# Patient Record
Sex: FEMALE | Race: WHITE | NOT HISPANIC OR LATINO | Employment: STUDENT | URBAN - METROPOLITAN AREA
[De-identification: names, ages, dates, MRNs, and addresses within clinical notes are randomized per-mention and may not be internally consistent; named-entity substitution may affect disease eponyms.]

---
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
## 2024-01-23 ENCOUNTER — HOSPITAL ENCOUNTER (EMERGENCY)
Facility: HOSPITAL | Age: 19
Discharge: HOME | End: 2024-01-23
Attending: EMERGENCY MEDICINE | Admitting: EMERGENCY MEDICINE
Payer: OTHER GOVERNMENT

## 2024-01-23 VITALS
BODY MASS INDEX: 22.13 KG/M2 | WEIGHT: 158.07 LBS | HEIGHT: 71 IN | RESPIRATION RATE: 20 BRPM | DIASTOLIC BLOOD PRESSURE: 86 MMHG | HEART RATE: 70 BPM | TEMPERATURE: 97.7 F | OXYGEN SATURATION: 100 % | SYSTOLIC BLOOD PRESSURE: 122 MMHG

## 2024-01-23 DIAGNOSIS — F10.920 ALCOHOLIC INTOXICATION WITHOUT COMPLICATION: Primary | ICD-10-CM

## 2024-01-23 LAB
ALBUMIN SERPL-MCNC: 4.2 G/DL (ref 3.5–5.2)
ALBUMIN/GLOB SERPL: 1.6 G/DL
ALP SERPL-CCNC: 62 U/L (ref 39–117)
ALT SERPL W P-5'-P-CCNC: 6 U/L (ref 1–33)
AMPHET+METHAMPHET UR QL: NEGATIVE
ANION GAP SERPL CALCULATED.3IONS-SCNC: 15.3 MMOL/L (ref 5–15)
APAP SERPL-MCNC: <5 MCG/ML (ref 0–30)
AST SERPL-CCNC: 17 U/L (ref 1–32)
BARBITURATES UR QL SCN: NEGATIVE
BASOPHILS # BLD AUTO: 0.07 10*3/MM3 (ref 0–0.2)
BASOPHILS NFR BLD AUTO: 0.8 % (ref 0–1.5)
BENZODIAZ UR QL SCN: NEGATIVE
BILIRUB SERPL-MCNC: 0.2 MG/DL (ref 0–1.2)
BUN SERPL-MCNC: 11 MG/DL (ref 6–20)
BUN/CREAT SERPL: 12.8 (ref 7–25)
CALCIUM SPEC-SCNC: 8.4 MG/DL (ref 8.6–10.5)
CANNABINOIDS SERPL QL: POSITIVE
CHLORIDE SERPL-SCNC: 109 MMOL/L (ref 98–107)
CO2 SERPL-SCNC: 19.7 MMOL/L (ref 22–29)
COCAINE UR QL: NEGATIVE
CREAT SERPL-MCNC: 0.86 MG/DL (ref 0.57–1)
DEPRECATED RDW RBC AUTO: 51.7 FL (ref 37–54)
EGFRCR SERPLBLD CKD-EPI 2021: 99.9 ML/MIN/1.73
EOSINOPHIL # BLD AUTO: 0.08 10*3/MM3 (ref 0–0.4)
EOSINOPHIL NFR BLD AUTO: 0.9 % (ref 0.3–6.2)
ERYTHROCYTE [DISTWIDTH] IN BLOOD BY AUTOMATED COUNT: 17.3 % (ref 12.3–15.4)
ETHANOL BLD-MCNC: 269 MG/DL (ref 0–10)
ETHANOL UR QL: 0.27 %
FENTANYL UR-MCNC: NEGATIVE NG/ML
GLOBULIN UR ELPH-MCNC: 2.6 GM/DL
GLUCOSE SERPL-MCNC: 102 MG/DL (ref 65–99)
HCG INTACT+B SERPL-ACNC: <0.5 MIU/ML
HCT VFR BLD AUTO: 33.7 % (ref 34–46.6)
HGB BLD-MCNC: 10.2 G/DL (ref 12–15.9)
HOLD SPECIMEN: NORMAL
HOLD SPECIMEN: NORMAL
IMM GRANULOCYTES # BLD AUTO: 0.02 10*3/MM3 (ref 0–0.05)
IMM GRANULOCYTES NFR BLD AUTO: 0.2 % (ref 0–0.5)
LYMPHOCYTES # BLD AUTO: 3.09 10*3/MM3 (ref 0.7–3.1)
LYMPHOCYTES NFR BLD AUTO: 33.7 % (ref 19.6–45.3)
MAGNESIUM SERPL-MCNC: 2 MG/DL (ref 1.7–2.2)
MCH RBC QN AUTO: 24.4 PG (ref 26.6–33)
MCHC RBC AUTO-ENTMCNC: 30.3 G/DL (ref 31.5–35.7)
MCV RBC AUTO: 80.6 FL (ref 79–97)
METHADONE UR QL SCN: NEGATIVE
MONOCYTES # BLD AUTO: 0.43 10*3/MM3 (ref 0.1–0.9)
MONOCYTES NFR BLD AUTO: 4.7 % (ref 5–12)
NEUTROPHILS NFR BLD AUTO: 5.49 10*3/MM3 (ref 1.7–7)
NEUTROPHILS NFR BLD AUTO: 59.7 % (ref 42.7–76)
NRBC BLD AUTO-RTO: 0 /100 WBC (ref 0–0.2)
OPIATES UR QL: NEGATIVE
OXYCODONE UR QL SCN: NEGATIVE
PLATELET # BLD AUTO: 213 10*3/MM3 (ref 140–450)
PMV BLD AUTO: 11.5 FL (ref 6–12)
POTASSIUM SERPL-SCNC: 3 MMOL/L (ref 3.5–5.2)
PROT SERPL-MCNC: 6.8 G/DL (ref 6–8.5)
RBC # BLD AUTO: 4.18 10*6/MM3 (ref 3.77–5.28)
SALICYLATES SERPL-MCNC: <0.3 MG/DL
SODIUM SERPL-SCNC: 144 MMOL/L (ref 136–145)
WBC NRBC COR # BLD AUTO: 9.18 10*3/MM3 (ref 3.4–10.8)
WHOLE BLOOD HOLD COAG: NORMAL
WHOLE BLOOD HOLD SPECIMEN: NORMAL

## 2024-01-23 PROCEDURE — 80307 DRUG TEST PRSMV CHEM ANLYZR: CPT | Performed by: EMERGENCY MEDICINE

## 2024-01-23 PROCEDURE — 80143 DRUG ASSAY ACETAMINOPHEN: CPT | Performed by: EMERGENCY MEDICINE

## 2024-01-23 PROCEDURE — 83735 ASSAY OF MAGNESIUM: CPT | Performed by: EMERGENCY MEDICINE

## 2024-01-23 PROCEDURE — 99283 EMERGENCY DEPT VISIT LOW MDM: CPT

## 2024-01-23 PROCEDURE — 25810000003 SODIUM CHLORIDE 0.9 % SOLUTION: Performed by: EMERGENCY MEDICINE

## 2024-01-23 PROCEDURE — 84702 CHORIONIC GONADOTROPIN TEST: CPT | Performed by: EMERGENCY MEDICINE

## 2024-01-23 PROCEDURE — 82077 ASSAY SPEC XCP UR&BREATH IA: CPT | Performed by: EMERGENCY MEDICINE

## 2024-01-23 PROCEDURE — 80179 DRUG ASSAY SALICYLATE: CPT | Performed by: EMERGENCY MEDICINE

## 2024-01-23 PROCEDURE — 85025 COMPLETE CBC W/AUTO DIFF WBC: CPT

## 2024-01-23 PROCEDURE — 80053 COMPREHEN METABOLIC PANEL: CPT | Performed by: EMERGENCY MEDICINE

## 2024-01-23 RX ORDER — SODIUM CHLORIDE 0.9 % (FLUSH) 0.9 %
10 SYRINGE (ML) INJECTION AS NEEDED
Status: DISCONTINUED | OUTPATIENT
Start: 2024-01-23 | End: 2024-01-23 | Stop reason: HOSPADM

## 2024-01-23 RX ADMIN — SODIUM CHLORIDE 1000 ML: 9 INJECTION, SOLUTION INTRAVENOUS at 02:12

## 2024-01-23 NOTE — ED PROVIDER NOTES
Time: 4:51 AM EST  Date of encounter:  1/23/2024  Independent Historian/Clinical History and Information was obtained by:   Patient    History is limited by:  alcohol intoxication    Chief Complaint: Alcohol intoxication      History of Present Illness:  Patient is a 18 y.o. year old female who presents to the emergency department for evaluation of Alcohol intoxication.  Per report patient drank about 1/5 of whiskey.  Patient was combative with EMS.  She did vomit prior to arrival.  Patient's mother is at bedside.  No known history of drug use.    HPI    Patient Care Team  Primary Care Provider: Nena Arreola APRN    Past Medical History:     Allergies   Allergen Reactions    Shellfish Allergy Anaphylaxis    Iodine Anxiety     Past Medical History:   Diagnosis Date    Anxiety     Nausea and vomiting 06/08/2023    Panic disorder     Self-injurious behavior     Shellfish allergy     Suicide attempt     Last attempt 2020     Past Surgical History:   Procedure Laterality Date    COLONOSCOPY N/A 6/14/2023    Procedure: COLONOSCOPY;  Surgeon: Pj Mendoza MD;  Location: [facility] ENDOSCOPY;  Service: Gastroenterology;  Laterality: N/A;  normal colon    ENDOSCOPY N/A 6/14/2023    Procedure: ESOPHAGOGASTRODUODENOSCOPY  with biopsies;  Surgeon: Pj Mendoza MD;  Location: [facility] ENDOSCOPY;  Service: Gastroenterology;  Laterality: N/A;  normal egd     Family History   Problem Relation Age of Onset    Self-Injurious Behavior  Mother     Anxiety disorder Mother     Suicide Attempts Father     Depression Father     Bipolar disorder Father     Anxiety disorder Sister     Depression Sister     Colon cancer Neg Hx        Home Medications:  Prior to Admission medications    Medication Sig Start Date End Date Taking? Authorizing Provider   ARIPiprazole (Abilify) 5 MG tablet Take 1 tablet by mouth Daily. 9/19/23   Josi Stoner APRN   norethindrone-ethinyl estradiol FE (Loestrin Fe 1/20) 1-20 MG-MCG per tablet  "Take 1 tablet by mouth Daily. 10/4/23 10/3/24  Nena Arreola, APRN        Social History:   Social History     Tobacco Use    Smoking status: Never     Passive exposure: Never    Smokeless tobacco: Never    Tobacco comments:     Vape with nicotine    Vaping Use    Vaping Use: Every day    Substances: Nicotine, Flavoring    Devices: Disposable    Passive vaping exposure: Yes   Substance Use Topics    Alcohol use: Yes    Drug use: Not Currently     Types: Marijuana, Psilocybin         Review of Systems:  Review of Systems   Unable to perform ROS: Mental status change        Physical Exam:  /86 (BP Location: Left arm, Patient Position: Lying)   Pulse 70   Temp 97.7 °F (36.5 °C) (Axillary)   Resp 20   Ht 180.3 cm (71\")   Wt 71.7 kg (158 lb 1.1 oz)   SpO2 100%   BMI 22.05 kg/m²     Physical Exam  Constitutional:       Appearance: Normal appearance.   HENT:      Head: Normocephalic and atraumatic.      Nose: Nose normal.      Mouth/Throat:      Mouth: Mucous membranes are moist.   Eyes:      Extraocular Movements: Extraocular movements intact.      Conjunctiva/sclera: Conjunctivae normal.      Pupils: Pupils are equal, round, and reactive to light.   Cardiovascular:      Rate and Rhythm: Normal rate and regular rhythm.      Pulses: Normal pulses.      Heart sounds: Normal heart sounds.   Pulmonary:      Effort: Pulmonary effort is normal.      Breath sounds: Normal breath sounds.   Abdominal:      General: There is no distension.      Palpations: Abdomen is soft.      Tenderness: There is no abdominal tenderness.   Musculoskeletal:         General: Normal range of motion.      Cervical back: Normal range of motion.   Skin:     General: Skin is warm and dry.      Capillary Refill: Capillary refill takes less than 2 seconds.   Neurological:      General: No focal deficit present.      Mental Status: She is alert and oriented to person, place, and time. Mental status is at baseline.   Psychiatric:         Mood " and Affect: Mood normal.         Behavior: Behavior normal.                  Procedures:  Procedures      Medical Decision Making:      Comorbidities that affect care:    Anxiety    External Notes reviewed:    Previous Clinic Note: Patient was seen by behavioral health on 9/90/23 for recurrent depression, anxiety, PTSD.      The following orders were placed and all results were independently analyzed by me:  Orders Placed This Encounter   Procedures    [place] Draw    Comprehensive Metabolic Panel    Acetaminophen Level    Ethanol    Salicylate Level    Urine Drug Screen - Urine, Clean Catch    CBC Auto Differential    hCG, Quantitative, Pregnancy    Magnesium    NPO Diet NPO Type: Strict NPO    Continuous Pulse Oximetry    Vital Signs    Undress & Gown    Oxygen Therapy- Nasal Cannula; Titrate 1-6 LPM Per SpO2; 90 - 95%    POC Glucose Once    Insert Peripheral IV    Suicide Precautions    CBC & Differential    Green Top (Gel)    Lavender Top    Gold Top - SST    Light Blue Top       Medications Given in the Emergency Department:  Medications   sodium chloride 0.9 % flush 10 mL (has no administration in time range)   sodium chloride 0.9 % bolus 1,000 mL (1,000 mL Intravenous Not Given 1/23/24 0408)   sodium chloride 0.9 % bolus 1,000 mL (0 mL Intravenous Stopped 1/23/24 0236)        ED Course:         Labs:    Lab Results (last 24 hours)       Procedure Component Value Units Date/Time    CBC & Differential [[id number]]  (Abnormal) Collected: 01/23/24 0155    Specimen: Blood Updated: 01/23/24 0214    Narrative:      The following orders were created for panel order CBC & Differential.  Procedure                               Abnormality         Status                     ---------                               -----------         ------                     CBC Auto Differential[[id number]]        Abnormal            Final result                 Please view results for these tests on the individual orders.     Comprehensive Metabolic Panel [[id number]]  (Abnormal) Collected: 01/23/24 0155    Specimen: Blood Updated: 01/23/24 0253     Glucose 102 mg/dL      BUN 11 mg/dL      Creatinine 0.86 mg/dL      Sodium 144 mmol/L      Potassium 3.0 mmol/L      Chloride 109 mmol/L      CO2 19.7 mmol/L      Calcium 8.4 mg/dL      Total Protein 6.8 g/dL      Albumin 4.2 g/dL      ALT (SGPT) 6 U/L      AST (SGOT) 17 U/L      Alkaline Phosphatase 62 U/L      Total Bilirubin 0.2 mg/dL      Globulin 2.6 gm/dL      A/G Ratio 1.6 g/dL      BUN/Creatinine Ratio 12.8     Anion Gap 15.3 mmol/L      eGFR 99.9 mL/min/1.73     Narrative:      GFR Normal >60  Chronic Kidney Disease <60  Kidney Failure <15      Acetaminophen Level [[id number]]  (Normal) Collected: 01/23/24 0155    Specimen: Blood Updated: 01/23/24 0242     Acetaminophen <5.0 mcg/mL     Ethanol [[id number]]  (Abnormal) Collected: 01/23/24 0155    Specimen: Blood Updated: 01/23/24 0242     Ethanol 269 mg/dL      Ethanol % 0.269 %     Narrative:      Ethanol (Plasma)  <10 Essentially Negative    Toxic Concentrations           mg/dL    Flushing, slowing of reflexes    Impaired visual activity         Depression of CNS              >100  Possible Coma                  >300       Salicylate Level [[id number]]  (Normal) Collected: 01/23/24 0155    Specimen: Blood Updated: 01/23/24 0242     Salicylate <0.3 mg/dL     CBC Auto Differential [[id number]]  (Abnormal) Collected: 01/23/24 0155    Specimen: Blood Updated: 01/23/24 0214     WBC 9.18 10*3/mm3      RBC 4.18 10*6/mm3      Hemoglobin 10.2 g/dL      Hematocrit 33.7 %      MCV 80.6 fL      MCH 24.4 pg      MCHC 30.3 g/dL      RDW 17.3 %      RDW-SD 51.7 fl      MPV 11.5 fL      Platelets 213 10*3/mm3      Neutrophil % 59.7 %      Lymphocyte % 33.7 %      Monocyte % 4.7 %      Eosinophil % 0.9 %      Basophil % 0.8 %      Immature Grans % 0.2 %      Neutrophils, Absolute 5.49 10*3/mm3      Lymphocytes, Absolute 3.09 10*3/mm3       Monocytes, Absolute 0.43 10*3/mm3      Eosinophils, Absolute 0.08 10*3/mm3      Basophils, Absolute 0.07 10*3/mm3      Immature Grans, Absolute 0.02 10*3/mm3      nRBC 0.0 /100 WBC     hCG, Quantitative, Pregnancy [[id number]] Collected: 01/23/24 0155    Specimen: Blood Updated: 01/23/24 0241     HCG Quantitative <0.50 mIU/mL     Narrative:      HCG Ranges by Gestational Age    Females - non-pregnant premenopausal   </= 1mIU/mL HCG  Females - postmenopausal               </= 7mIU/mL HCG    3 Weeks       5.4   -      72 mIU/mL  4 Weeks      10.2   -     708 mIU/mL  5 Weeks       217   -   8,245 mIU/mL  6 Weeks       152   -  32,177 mIU/mL  7 Weeks     4,059   - 153,767 mIU/mL  8 Weeks    31,366   - 149,094 mIU/mL  9 Weeks    59,109   - 135,901 mIU/mL  10 Weeks   44,186   - 170,409 mIU/mL  12 Weeks   27,107   - 201,615 mIU/mL  14 Weeks   24,302   -  93,646 mIU/mL  15 Weeks   12,540   -  69,747 mIU/mL  16 Weeks    8,904   -  55,332 mIU/mL  17 Weeks    8,240   -  51,793 mIU/mL  18 Weeks    9,649   -  55,271 mIU/mL      Magnesium [[id number]]  (Normal) Collected: 01/23/24 0155    Specimen: Blood Updated: 01/23/24 0340     Magnesium 2.0 mg/dL     Urine Drug Screen - Urine, Clean Catch [[id number]]  (Abnormal) Collected: 01/23/24 0406    Specimen: Urine, Clean Catch Updated: 01/23/24 0439     Amphet/Methamphet, Screen Negative     Barbiturates Screen, Urine Negative     Benzodiazepine Screen, Urine Negative     Cocaine Screen, Urine Negative     Opiate Screen Negative     THC, Screen, Urine Positive     Methadone Screen, Urine Negative     Oxycodone Screen, Urine Negative     Fentanyl, Urine Negative    Narrative:      Negative Thresholds Per Drugs Screened:    Amphetamines                 500 ng/ml  Barbiturates                 200 ng/ml  Benzodiazepines              100 ng/ml  Cocaine                      300 ng/ml  Methadone                    300 ng/ml  Opiates                      300 ng/ml  Oxycodone                     100 ng/ml  THC                           50 ng/ml  Fentanyl                       5 ng/ml      The Normal Value for all drugs tested is negative. This report includes final unconfirmed screening results to be used for medical treatment purposes only. Unconfirmed results must not be used for non-medical purposes such as employment or legal testing. Clinical consideration should be applied to any drug of abuse test, particularly when unconfirmed results are used.                     Imaging:    No Radiology Exams Resulted Within Past 24 Hours      Differential Diagnosis and Discussion:    Psychiatric: Differential diagnosis includes but is not limited to depression, psychosis, bipolar disorder, anxiety, manic episode, schizophrenia, and substance abuse.    All labs were reviewed and interpreted by me.    MDM  Number of Diagnoses or Management Options  Diagnosis management comments: Patient is afebrile nontoxic-appearing.  Vital signs are stable.  At time of evaluation she is lying in bed in no acute distress.  Patient presented to the emergency department with alcohol intoxication.  Labs were obtained that showed elevated ALK level of 269.  She also tested positive for THC.  Patient was given IV fluids.  She was monitored for several hours.  On reevaluation patient is alert oriented and able to ambulate tolerating p.o. intake.  Mother is at bedside who will be at home with her.  Recommend follow-up with her primary physician.  Discussed return precautions, discharge instructions and answered all their questions.       Amount and/or Complexity of Data Reviewed  Clinical lab tests: reviewed  Review and summarize past medical records: yes  Independent visualization of images, tracings, or specimens: yes    Risk of Complications, Morbidity, and/or Mortality  Presenting problems: moderate  Management options: moderate                 Patient Care Considerations:    CHEST X-RAY: I considered ordering a chest x-ray however  no respiratory symptoms      Consultants/Shared Management Plan:    None    Social Determinants of Health:    Patient has presented with family members who are responsible, reliable and will ensure follow up care.      Disposition and Care Coordination:    Discharged: The patient is suitable and stable for discharge with no need for consideration of admission.    I have explained the patient´s condition, diagnoses and treatment plan based on the information available to me at this time. I have answered questions and addressed any concerns. The patient has a good  understanding of the patient´s diagnosis, condition, and treatment plan as can be expected at this point. The vital signs have been stable. The patient´s condition is stable and appropriate for discharge from the emergency department.      The patient will pursue further outpatient evaluation with the primary care physician or other designated or consulting physician as outlined in the discharge instructions. They are agreeable to this plan of care and follow-up instructions have been explained in detail. The patient has received these instructions in written format and have expressed an understanding of the discharge instructions. The patient is aware that any significant change in condition or worsening of symptoms should prompt an immediate return to this or the closest emergency department or call to 911.  I have explained discharge medications and the need for follow up with the patient/caretakers. This was also printed in the discharge instructions. Patient was discharged with the following medications and follow up:      Medication List      No changes were made to your prescriptions during this visit.      Nena Arreola, APRN  [address] [address]  [phone]    In 2 days         Final diagnoses:   Alcoholic intoxication without complication        ED Disposition       ED Disposition   Discharge    Condition   Stable     Comment   --               This medical record created using voice recognition software.             Reyes Robertson MD  01/23/24 3443

## 2024-01-23 NOTE — ED NOTES
Patient found standing in room with parent. Patient able to walk to restroom with parent and obtain urine sample. Patient now in bed asleep lying on side.

## 2024-06-07 ENCOUNTER — TELEPHONE (OUTPATIENT)
Dept: INTERNAL MEDICINE | Facility: CLINIC | Age: 19
End: 2024-06-07
Payer: OTHER GOVERNMENT

## 2024-06-07 NOTE — TELEPHONE ENCOUNTER
Pt needs in office appt to discuss early pregnancy and symptoms. If she is out of town, she needs to est with PCP/OB in [place]. She can try increasing frequency of small meals, increased fluid intake, otc unisom to help but needs to be seen/evaluated for this in person.

## 2024-06-07 NOTE — TELEPHONE ENCOUNTER
Pt return call to the office, explained to pt she needs an office apt, pt agreeable to apt, apt scheduled for next Friday with PCP.

## 2024-06-07 NOTE — TELEPHONE ENCOUNTER
Patient called office stating she is almost 2 months pregnant and she has been having morning sickness to the point that she has been going to the ER, pt states she can't keep anything down, patient states she was prescribe phenergan but it didn't really help the only time it did was thru IV, I ask patient if she was seeing OB and she states she is not, she is needing a referral for OB, pt is currently in [place] for school, patient would like to know what she can do to help with her symptoms, please advise

## 2024-08-29 ENCOUNTER — OFFICE VISIT (OUTPATIENT)
Dept: INTERNAL MEDICINE | Facility: CLINIC | Age: 19
End: 2024-08-29
Payer: OTHER GOVERNMENT

## 2024-08-29 VITALS
WEIGHT: 152 LBS | HEIGHT: 71 IN | RESPIRATION RATE: 18 BRPM | OXYGEN SATURATION: 100 % | SYSTOLIC BLOOD PRESSURE: 122 MMHG | TEMPERATURE: 99.1 F | HEART RATE: 100 BPM | BODY MASS INDEX: 21.28 KG/M2 | DIASTOLIC BLOOD PRESSURE: 64 MMHG

## 2024-08-29 DIAGNOSIS — F43.21 GRIEF: ICD-10-CM

## 2024-08-29 DIAGNOSIS — F41.1 GENERALIZED ANXIETY DISORDER: ICD-10-CM

## 2024-08-29 DIAGNOSIS — Z30.013 ENCOUNTER FOR INITIAL PRESCRIPTION OF INJECTABLE CONTRACEPTIVE: ICD-10-CM

## 2024-08-29 DIAGNOSIS — F33.3 SEVERE EPISODE OF RECURRENT MAJOR DEPRESSIVE DISORDER, WITH PSYCHOTIC FEATURES: ICD-10-CM

## 2024-08-29 DIAGNOSIS — Z51.81 MEDICATION MONITORING ENCOUNTER: ICD-10-CM

## 2024-08-29 DIAGNOSIS — M25.50 ARTHRALGIA, UNSPECIFIED JOINT: Primary | ICD-10-CM

## 2024-08-29 LAB
B-HCG UR QL: NEGATIVE
CHROMATIN AB SERPL-ACNC: <10 IU/ML (ref 0–14)
CRP SERPL-MCNC: <0.3 MG/DL (ref 0–0.5)
ERYTHROCYTE [SEDIMENTATION RATE] IN BLOOD: 7 MM/HR (ref 0–20)
EXPIRATION DATE: NORMAL
INTERNAL NEGATIVE CONTROL: NEGATIVE
INTERNAL POSITIVE CONTROL: NORMAL
Lab: NORMAL

## 2024-08-29 PROCEDURE — 81025 URINE PREGNANCY TEST: CPT | Performed by: NURSE PRACTITIONER

## 2024-08-29 PROCEDURE — 86038 ANTINUCLEAR ANTIBODIES: CPT | Performed by: NURSE PRACTITIONER

## 2024-08-29 PROCEDURE — 99214 OFFICE O/P EST MOD 30 MIN: CPT | Performed by: NURSE PRACTITIONER

## 2024-08-29 PROCEDURE — 86140 C-REACTIVE PROTEIN: CPT | Performed by: NURSE PRACTITIONER

## 2024-08-29 PROCEDURE — 86431 RHEUMATOID FACTOR QUANT: CPT | Performed by: NURSE PRACTITIONER

## 2024-08-29 PROCEDURE — 96372 THER/PROPH/DIAG INJ SC/IM: CPT | Performed by: NURSE PRACTITIONER

## 2024-08-29 PROCEDURE — 36415 COLL VENOUS BLD VENIPUNCTURE: CPT | Performed by: NURSE PRACTITIONER

## 2024-08-29 PROCEDURE — 86200 CCP ANTIBODY: CPT | Performed by: NURSE PRACTITIONER

## 2024-08-29 PROCEDURE — 85652 RBC SED RATE AUTOMATED: CPT | Performed by: NURSE PRACTITIONER

## 2024-08-29 RX ORDER — ARIPIPRAZOLE 5 MG/1
5 TABLET ORAL DAILY
Qty: 30 TABLET | Refills: 1 | Status: SHIPPED | OUTPATIENT
Start: 2024-08-29

## 2024-08-29 RX ORDER — HYDROXYZINE HYDROCHLORIDE 25 MG/1
25 TABLET, FILM COATED ORAL 3 TIMES DAILY PRN
Qty: 60 TABLET | Refills: 0 | Status: SHIPPED | OUTPATIENT
Start: 2024-08-29

## 2024-08-29 RX ORDER — MEDROXYPROGESTERONE ACETATE 150 MG/ML
150 INJECTION, SUSPENSION INTRAMUSCULAR ONCE
Status: COMPLETED | OUTPATIENT
Start: 2024-08-29 | End: 2024-08-29

## 2024-08-29 RX ORDER — FAMOTIDINE 40 MG/1
40 TABLET, FILM COATED ORAL DAILY
Qty: 90 TABLET | Refills: 0 | Status: SHIPPED | OUTPATIENT
Start: 2024-08-29

## 2024-08-29 RX ADMIN — MEDROXYPROGESTERONE ACETATE 150 MG: 150 INJECTION, SUSPENSION INTRAMUSCULAR at 11:02

## 2024-08-29 NOTE — PROGRESS NOTES
"Chief Complaint  Contraception (Discuss new birth control. Discuss birth control shot), ADHD (Siblings has ADHD. Focusing and had to do tasks productive. Hard to understand when reading. Patient has had these issues since she was in school. ), Anxiety (Concerns with mental health for a year. Anxiety screening high.), and Depression (Patient did have a miscarriage recently. Thoughts of self harm. Depression screening high.)    Subjective          Brynn Jean presents to [facility]  History of Present Illness  Contraception-patient would like to discuss starting a new birth control.  Reports issues with taking daily pill.  Patiently recently had a miscarriage in June    Depression-she reports having thoughts of self-harm.  She has had some cutting recently  Having more anxiety over the last year which has worsened since having the miscarriage  She feels like \"my brain is separate from my body, like I don't know what is going on\"  She reports thoughts about self harm daily, often  She does not act on these thoughts often  She does have thoughts of killing herself but does not have a plan. She reports that she does not want to kill herself. She has been cutting, last time 2 wks ago.   She states \"I know I wouldn't want to hurt myself if I felt better\"  Has been doing some mindfulness practices with her mom which she reports helps. Mom reports panic attacks at times.  She reports taking the Abilify last year for a short period of time.  She states she only took this for a few days and stopped so she is unsure if it was effective.  She denies any side effects with it but states that she has bad at taking pills.  She is currently staying with her mom but residence is in [place].  She reports plan to move back to [facility].  Patient reports that she did enjoy seeing Josi and would like to see her again.    Having hip and back pain and hand pain bilateral  Family hx " "of RA    Also concerned about ADHD.  She reports that siblings have ADHD.  She has difficulty focusing and with staying on task.  Issues with concentrating while reading work.  She reports this is not a new issue but has been going on for many years.    Objective   Vital Signs:   /64 (BP Location: Left arm, Patient Position: Sitting, Cuff Size: Small Adult)   Pulse 100   Temp 99.1 °F (37.3 °C)   Resp 18   Ht 180.3 cm (71\")   Wt 68.9 kg (152 lb)   SpO2 100%   BMI 21.20 kg/m²     Physical Exam  Vitals and nursing note reviewed.   Constitutional:       General: She is not in acute distress.     Appearance: Normal appearance.   HENT:      Head: Normocephalic and atraumatic.      Right Ear: External ear normal.      Left Ear: External ear normal.      Nose: Nose normal.      Mouth/Throat:      Mouth: Mucous membranes are moist.   Eyes:      Conjunctiva/sclera: Conjunctivae normal.   Cardiovascular:      Rate and Rhythm: Normal rate and regular rhythm.      Pulses: Normal pulses.      Heart sounds: Normal heart sounds. No murmur heard.     No friction rub. No gallop.   Pulmonary:      Effort: Pulmonary effort is normal. No respiratory distress.      Breath sounds: No wheezing, rhonchi or rales.   Abdominal:      General: Bowel sounds are normal.      Palpations: Abdomen is soft.      Tenderness: There is no abdominal tenderness.   Musculoskeletal:      Cervical back: Neck supple.      Right lower leg: No edema.      Left lower leg: No edema.   Skin:     General: Skin is warm and dry.   Neurological:      General: No focal deficit present.      Mental Status: She is alert and oriented to person, place, and time.   Psychiatric:         Mood and Affect: Mood normal.         Behavior: Behavior normal.        Result Review :          Procedures      Assessment and Plan    Diagnoses and all orders for this visit:    1. Arthralgia, unspecified joint (Primary)  Comments:  Autoimmune labs  Orders:  -     GREG  -     " Cyclic Citrul Peptide Antibody, IgG / IgA  -     C-reactive Protein  -     Rheumatoid Factor  -     Sedimentation Rate    2. Severe episode of recurrent major depressive disorder, with psychotic features  -     ARIPiprazole (Abilify) 5 MG tablet; Take 1 tablet by mouth Daily.  Dispense: 30 tablet; Refill: 1    3. Generalized anxiety disorder  -     ARIPiprazole (Abilify) 5 MG tablet; Take 1 tablet by mouth Daily.  Dispense: 30 tablet; Refill: 1    4. Medication monitoring encounter  -     POCT pregnancy, urine    5. Encounter for initial prescription of injectable contraceptive  -     medroxyPROGESTERone (DEPO-PROVERA) injection 150 mg    6. Grief    Other orders  -     hydrOXYzine (ATARAX) 25 MG tablet; Take 1 tablet by mouth 3 (Three) Times a Day As Needed for Itching.  Dispense: 60 tablet; Refill: 0  -     famotidine (Pepcid) 40 MG tablet; Take 1 tablet by mouth Daily.  Dispense: 90 tablet; Refill: 0    Upreg in the office today negative.  Giving first Depo-Provera shot today in the office.  Patient tolerated well.  Discussed that this is a 3-month injection and she will need to return for her next injection and window as discussed.  recommended always using a back up form of birth control until she is ready to have a child and for STD protection.  Discussed that this will not be effective for the first month.  Denies concern for STDs at this time.  Patient verbalized understanding will call for concerns.    Restarting patient on Abilify for SI and self-harm behaviors.  Risks and benefits of medication discussed to include worsening risk for self-harm and SI.  Patient also rescheduled during visit today with Hafsa for September 4.  Discussed need for counseling and discussed grieving process at length.  Recommended that she continue to work on mindfulness practices and recommendations provided.  Will try hydroxyzine as needed for panic attacks.  Patient and mom verbalized risks and understanding of all that was  discussed.  Patient and mom agreeable to managing as an outpatient and preferred not to be evaluated for inpatient status at this time.  Patient agreeable to taking medication every day and allowing mom to assist with monitoring.  Patient verbalized understanding that if this changes, she can seek treatment at [facility] or the emergency department to be evaluated for inpatient treatment.  Patient will call with concerns or if she feels as if she may act on suicidal thoughts.          Follow Up   Return in about 2 weeks (around 9/12/2024).  Patient was given instructions and counseling regarding her condition or for health maintenance advice. Please see specific information pulled into the AVS if appropriate.

## 2024-08-30 LAB
ANA SER QL: NEGATIVE
CCP IGA+IGG SERPL IA-ACNC: 5 UNITS (ref 0–19)

## 2024-09-04 ENCOUNTER — OFFICE VISIT (OUTPATIENT)
Dept: BEHAVIORAL HEALTH | Facility: CLINIC | Age: 19
End: 2024-09-04
Payer: OTHER GOVERNMENT

## 2024-09-04 VITALS
BODY MASS INDEX: 21.7 KG/M2 | SYSTOLIC BLOOD PRESSURE: 117 MMHG | HEIGHT: 71 IN | WEIGHT: 155 LBS | DIASTOLIC BLOOD PRESSURE: 71 MMHG | HEART RATE: 70 BPM

## 2024-09-04 DIAGNOSIS — F31.60 BIPOLAR AFFECTIVE DISORDER, MIXED: ICD-10-CM

## 2024-09-04 DIAGNOSIS — F41.1 GENERALIZED ANXIETY DISORDER: Primary | ICD-10-CM

## 2024-09-04 DIAGNOSIS — F90.1 ADHD (ATTENTION DEFICIT HYPERACTIVITY DISORDER), PREDOMINANTLY HYPERACTIVE IMPULSIVE TYPE: ICD-10-CM

## 2024-09-04 DIAGNOSIS — F43.10 POST TRAUMATIC STRESS DISORDER (PTSD): ICD-10-CM
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
## 2024-09-04 NOTE — PATIENT INSTRUCTIONS
1.  Please return to clinic at your next scheduled visit.  Please contact the clinic ([phone]) at least 24 hours prior in the event you need to cancel.  2.  Do no harm to yourself or others.    3.  Avoid alcohol and drugs.    4.  Take all medications as prescribed.  Please contact the clinic with any concerns. If you are in need of medication refills, please call the clinic at [phone].    5. Should you want to get in touch with your provider, ARIANA Parra, please contact the office ([phone]), and staff will be able to page Josi directly.  6. In the event you have personal crisis, contact the following crisis numbers: Suicide Prevention Hotline 1-717.190.8624; [facility] Helpline 3-148-314-FUED; [facility] [phone]; text HELLO to 143184; or 966.     SPECIFIC RECOMMENDATIONS:     1.      Medications discussed at this encounter:     No orders of the defined types were placed in this encounter.                      2.      Psychotherapy recommendations: We will continue therapy at future visits.     3.     Return to clinic: Return in about 6 weeks (around 10/16/2024).
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
## 2024-09-12 ENCOUNTER — OFFICE VISIT (OUTPATIENT)
Dept: INTERNAL MEDICINE | Facility: CLINIC | Age: 19
End: 2024-09-12
Payer: OTHER GOVERNMENT

## 2024-09-12 VITALS
DIASTOLIC BLOOD PRESSURE: 72 MMHG | BODY MASS INDEX: 22.12 KG/M2 | TEMPERATURE: 97.6 F | HEIGHT: 71 IN | SYSTOLIC BLOOD PRESSURE: 124 MMHG | HEART RATE: 78 BPM | WEIGHT: 158 LBS | OXYGEN SATURATION: 98 %

## 2024-09-12 DIAGNOSIS — F41.1 GENERALIZED ANXIETY DISORDER: Primary | ICD-10-CM

## 2024-09-12 DIAGNOSIS — M25.50 ARTHRALGIA, UNSPECIFIED JOINT: ICD-10-CM

## 2024-09-12 DIAGNOSIS — F33.0 MAJOR DEPRESSIVE DISORDER, RECURRENT, MILD: ICD-10-CM

## 2024-09-12 DIAGNOSIS — Z30.9 ENCOUNTER FOR CONTRACEPTIVE MANAGEMENT, UNSPECIFIED TYPE: ICD-10-CM

## 2024-09-12 PROCEDURE — 90480 ADMN SARSCOV2 VAC 1/ONLY CMP: CPT | Performed by: NURSE PRACTITIONER

## 2024-09-12 PROCEDURE — 91320 SARSCV2 VAC 30MCG TRS-SUC IM: CPT | Performed by: NURSE PRACTITIONER

## 2024-09-12 PROCEDURE — 90471 IMMUNIZATION ADMIN: CPT | Performed by: NURSE PRACTITIONER

## 2024-09-12 PROCEDURE — 99214 OFFICE O/P EST MOD 30 MIN: CPT | Performed by: NURSE PRACTITIONER

## 2024-09-12 PROCEDURE — 90656 IIV3 VACC NO PRSV 0.5 ML IM: CPT | Performed by: NURSE PRACTITIONER

## 2024-09-12 NOTE — LETTER
[facility]  Vaccine Consent Form    Patient Name:  Brynn Jean  Patient :  2005     Vaccine(s) Ordered    COVID-19 F23 (Pfizer) 12yrs+ (COMIRNATY)  Fluzone >6mos (9512-4953)        Screening Checklist  The following questions should be completed prior to vaccination. If you answer “yes” to any question, it does not necessarily mean you should not be vaccinated. It just means we may need to clarify or ask more questions. If a question is unclear, please ask your healthcare provider to explain it.    Yes No   Any fever or moderate to severe illness today (mild illness and/or antibiotic treatment are not contraindications)?     Do you have a history of a serious reaction to any previous vaccinations, such as anaphylaxis, encephalopathy within 7 days, Guillain-[place] syndrome within 6 weeks, seizure?     Have you received any live vaccine(s) (e.g MMR, JOAQUIN) or any other vaccines in the last month (to ensure duplicate doses aren't given)?     Do you have an anaphylactic allergy to latex (DTaP, DTaP-IPV, Hep A, Hep B, MenB, RV, Td, Tdap), baker’s yeast (Hep B, HPV), polysorbates (RSV, nirsevimab, PCV 20, Rotavirrus, Tdap, Shingrix), or gelatin (JOAQUIN, MMR)?     Do you have an anaphylactic allergy to neomycin (Rabies, JOAQUIN, MMR, IPV, Hep A), polymyxin B (IPV), or streptomycin (IPV)?      Any cancer, leukemia, AIDS, or other immune system disorder? (JOAQUIN, MMR, RV)     Do you have a parent, brother, or sister with an immune system problem (if immune competence of vaccine recipient clinically verified, can proceed)? (MMR, JOAQUIN)     Any recent steroid treatments for >2 weeks, chemotherapy, or radiation treatment? (JOAQUIN, MMR)     Have you received antibody-containing blood transfusions or IVIG in the past 11 months (recommended interval is dependent on product)? (MMR, JOAQUIN)     Have you taken antiviral drugs (acyclovir, famciclovir, valacyclovir for JOAQUIN) in the last 24 or 48 hours, respectively?      Are you pregnant  "or planning to become pregnant within 1 month? (JOAQUIN, MMR, HPV, IPV, MenB, Abrexvy; For Hep B- refer to Engerix-B; For RSV - Abrysvo is indicated for 32-36 weeks of pregnancy from September to January)     For infants, have you ever been told your child has had intussusception or a medical emergency involving obstruction of the intestine (Rotavirus)? If not for an infant, can skip this question.         *Ordering Physicians/APC should be consulted if \"yes\" is checked by the patient or guardian above.  I have received, read, and understand the Vaccine Information Statement (VIS) for each vaccine ordered.  I have considered my or my child's health status as well as the health status of my close contacts.  I have taken the opportunity to discuss my vaccine questions with my or my child's health care provider.   I have requested that the ordered vaccine(s) be given to me or my child.  I understand the benefits and risks of the vaccines.  I understand that I should remain in the clinic for 15 minutes after receiving the vaccine(s).  _________________________________________________________  Signature of Patient or Parent/Legal Guardian ____________________  Date     "

## 2024-09-12 NOTE — PROGRESS NOTES
"Chief Complaint  Joint Pain and Contraception    Subjective          Brynn Jean presents to [facility]  History of Present Illness  She reports that  abilify is helping to regular mood better  Hydroxyzine helps with anxious thoughts. Taking bid prn  Some difficulty sleeping at night  Denies  self harm recently  Having less intrusive thoughts, denies SI  Currently doing counseling online which is helping  Has appt with Josi next month.   Staying at moms currently. Will be moving into an appt in 2 wks with boyfriend  She reports that boyfriend is very caring and concerned about her mental health.     She reports having more mood issues following Depo-Provera injection.  She would like to go back to oral contraceptive when she is due for her next Depo-Provera injection    Joint pain is ongoing.  Reviewed normal autoimmune labs  Reports not having healthy lifestyle habits currently due to mental health  Objective   Vital Signs:   /72   Pulse 78   Temp 97.6 °F (36.4 °C) (Temporal)   Ht 180.3 cm (71\")   Wt 71.7 kg (158 lb)   SpO2 98%   BMI 22.04 kg/m²     Physical Exam  Vitals and nursing note reviewed.   Constitutional:       General: She is not in acute distress.     Appearance: Normal appearance.   HENT:      Head: Normocephalic and atraumatic.      Right Ear: External ear normal.      Left Ear: External ear normal.      Nose: Nose normal.      Mouth/Throat:      Mouth: Mucous membranes are moist.   Eyes:      Conjunctiva/sclera: Conjunctivae normal.   Cardiovascular:      Rate and Rhythm: Normal rate and regular rhythm.      Pulses: Normal pulses.      Heart sounds: Normal heart sounds. No murmur heard.     No friction rub. No gallop.   Pulmonary:      Effort: Pulmonary effort is normal. No respiratory distress.      Breath sounds: No wheezing, rhonchi or rales.   Musculoskeletal:      Cervical back: Neck supple.      Right lower leg: No edema. "      Left lower leg: No edema.   Skin:     General: Skin is warm and dry.   Neurological:      General: No focal deficit present.      Mental Status: She is alert and oriented to person, place, and time.   Psychiatric:         Mood and Affect: Mood normal.         Behavior: Behavior normal.        Result Review :          Procedures      Assessment and Plan    Diagnoses and all orders for this visit:    1. Generalized anxiety disorder (Primary)    2. Major depressive disorder, recurrent, mild  Comments:  Doing well with Abilify.  She will plan to follow-up with Josi as scheduled    3. Encounter for contraceptive management, unspecified type  Comments:  Plan next visit for discussion of starting oral contraceptive    4. Arthralgia, unspecified joint  Comments:  She will continue to work on healthy lifestyle habits to include good sleep hygiene, healthy eating and increased physical activity    Other orders  -     COVID-19 F23 (Pfizer) 12yrs+ (COMIRNATY)  -     Fluzone >6mos ([id number])              Follow Up   Return in about 2 months (around 11/12/2024).  Patient was given instructions and counseling regarding her condition or for health maintenance advice. Please see specific information pulled into the AVS if appropriate.

## 2024-09-17 ENCOUNTER — TELEPHONE (OUTPATIENT)
Dept: INTERNAL MEDICINE | Facility: CLINIC | Age: 19
End: 2024-09-17
Payer: OTHER GOVERNMENT

## 2024-10-14 ENCOUNTER — TELEPHONE (OUTPATIENT)
Dept: PSYCHIATRY | Facility: CLINIC | Age: 19
End: 2024-10-14
Payer: OTHER GOVERNMENT

## 2024-10-14 DIAGNOSIS — F33.1 MODERATE EPISODE OF RECURRENT MAJOR DEPRESSIVE DISORDER: ICD-10-CM

## 2024-10-14 DIAGNOSIS — F41.1 GENERALIZED ANXIETY DISORDER: Primary | ICD-10-CM

## 2024-10-14 NOTE — TELEPHONE ENCOUNTER
PT CALLED STATES FEELS LIKE ABILIFY DOES NOT WORK STATES SHE DOES NOT FEELS LIKE MEDICATION IS WORKING NOR HELPING. PT STATES SHE IS REALLY DEPRESSED STATES HAS FELT LIKE THIS FOR OVER 6 MONTHS. PT STATES SHE FELT A LITTLE BETTER AS SHE RECENTLY STATES SHE FELT LIKE SHE WAS STRUGGLING WITH SELF HARM PT STATES SHE IS NOT ACTIVELY HAVING ANY SI PLEASE ADVISE. PT MOTHER WAS ON LINE ALSO INTERESTED IN PT STARTING LEXAPRO AS IT WORKED FOR HER AND PT SISTER

## 2024-10-15 NOTE — TELEPHONE ENCOUNTER
Is patient keeping up keeping appointment with me on October 23, 2024?  As patient is still taking Abilify 5 mg daily?  It is my understanding that she is going to be switching providers to Dr. Mazariegos.  Would be okay with initiating prescription for Lexapro if patient would like to start this medication before seeing him.  Just wanted to clarify as she has appointment scheduled with both myself and Dr. Mazariegos at this time.

## 2024-10-16 RX ORDER — ESCITALOPRAM OXALATE 5 MG/1
5 TABLET ORAL DAILY
Qty: 30 TABLET | Refills: 1 | Status: SHIPPED | OUTPATIENT
Start: 2024-10-16

## 2024-10-16 NOTE — TELEPHONE ENCOUNTER
Okay to start lexapro 5 mg daily  Previously patient I have been working through diagnoses considering ADHD versus bipolar affective disorder.  However, this has been difficult as patient did not return for 1 year for follow-up.  So, very difficult to address without routine follow-up.  Patient states that she has not been taking Abilify.  Reports taking hydroxyzine as needed for anxiety.  Patient would like to start Lexapro to target symptoms of depression.  We discussed the potential for induction of manic symptoms if patient does have bipolar affective disorder.  Patient expressed understanding and is agreeable to report any of the symptoms.  We do have a follow-up appointment on October 23, 2024.  We will evaluate for these symptoms.    LEXAPRO (ESCITALOPRAM)  Risks, benefits, alternatives discussed with patient including induction/activation of manic symptoms, GI upset, nausea vomiting diarrhea, theoretical decrease of seizure threshold predisposing the patient to a slightly higher seizure risk, headaches, sexual dysfunction, serotonin syndrome, bleeding risk.  After discussion of these risks and benefits, the patient voiced understanding and agreed to proceed.     Medication sent to patient's preferred pharmacy in record.

## 2024-10-16 NOTE — TELEPHONE ENCOUNTER
PT(PATIENT) VERIFIED     CONTACTED PT(PATIENT) PER PROVIDER'S INSTRUCTIONS     PT(PATIENT) STATES SHE STILL PLANS ON MEETING WITH PROVIDER ON Appointment with Josi Stoner APRN (10/23/2024)     NEW PROVIDER APPT  Appointment with Shane Culp MD (2025)     is still taking Abilify 5 mg daily?   PT(PATIENT) STATES SHE TRIED AGAIN, BUT DIDN'T FEEL LIKE IT WAS HELPING HER SO SHE STOPPED AGAIN     PT(PATIENT) STATES SHE DOESN'T FEEL LIKE SHE HAS MOOD ISSUES    PT(PATIENT) STATES SHE NEEDS SOMETHING TO HELP WITH HER DEPRESSION     initiating prescription for Lexapro if patient would like to start this medication   PT(PATIENT) STATES SHE WOULD LIKE TO TRY THE LEXAPRO    PLEASE ADVISE

## 2024-10-23 ENCOUNTER — OFFICE VISIT (OUTPATIENT)
Dept: BEHAVIORAL HEALTH | Facility: CLINIC | Age: 19
End: 2024-10-23
Payer: OTHER GOVERNMENT

## 2024-10-23 VITALS
SYSTOLIC BLOOD PRESSURE: 128 MMHG | BODY MASS INDEX: 23.94 KG/M2 | HEART RATE: 88 BPM | WEIGHT: 171 LBS | DIASTOLIC BLOOD PRESSURE: 82 MMHG | HEIGHT: 71 IN

## 2024-10-23 DIAGNOSIS — F41.1 GENERALIZED ANXIETY DISORDER: ICD-10-CM

## 2024-10-23 DIAGNOSIS — F43.10 POST TRAUMATIC STRESS DISORDER (PTSD): ICD-10-CM

## 2024-10-23 DIAGNOSIS — F31.60 BIPOLAR AFFECTIVE DISORDER, MIXED: Primary | ICD-10-CM

## 2024-10-23 RX ORDER — LURASIDONE HYDROCHLORIDE 20 MG/1
20 TABLET, FILM COATED ORAL DAILY
Qty: 30 TABLET | Refills: 1 | Status: SHIPPED | OUTPATIENT
Start: 2024-10-23
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
## 2024-10-23 NOTE — PATIENT INSTRUCTIONS
1.  Please return to clinic at your next scheduled visit.  Please contact the clinic ([phone]) at least 24 hours prior in the event you need to cancel.  2.  Do no harm to yourself or others.    3.  Avoid alcohol and drugs.    4.  Take all medications as prescribed.  Please contact the clinic with any concerns. If you are in need of medication refills, please call the clinic at [phone].    5. Should you want to get in touch with your provider, ARIANA Parra, please contact the office ([phone]), and staff will be able to page Josi directly.  6. In the event you have personal crisis, contact the following crisis numbers: Suicide Prevention Hotline 1-422.479.7169; [facility] Helpline 3-616-611-CLLW; [facility] Emergency Room [phone]; text HELLO to 163792; or 140.     SPECIFIC RECOMMENDATIONS:     1.      Medications discussed at this encounter:     New Medications Ordered This Visit   Medications    Lurasidone HCl (LATUDA) 20 MG tablet tablet     Sig: Take 1 tablet by mouth Daily.     Dispense:  30 tablet     Refill:  1                       2.      Psychotherapy recommendations: We will continue therapy at future visits.     3.     Return to clinic: Return in about 6 weeks (around 12/4/2024).

## 2024-11-14 ENCOUNTER — OFFICE VISIT (OUTPATIENT)
Dept: INTERNAL MEDICINE | Facility: CLINIC | Age: 19
End: 2024-11-14
Payer: OTHER GOVERNMENT

## 2024-11-14 VITALS
DIASTOLIC BLOOD PRESSURE: 74 MMHG | BODY MASS INDEX: 26.04 KG/M2 | WEIGHT: 186 LBS | OXYGEN SATURATION: 100 % | HEIGHT: 71 IN | RESPIRATION RATE: 18 BRPM | TEMPERATURE: 98.2 F | SYSTOLIC BLOOD PRESSURE: 120 MMHG | HEART RATE: 82 BPM

## 2024-11-14 DIAGNOSIS — R11.2 NAUSEA AND VOMITING, UNSPECIFIED VOMITING TYPE: ICD-10-CM

## 2024-11-14 DIAGNOSIS — Z97.5 IUD CONTRACEPTION: Primary | ICD-10-CM

## 2024-11-14 DIAGNOSIS — R51.9 NONINTRACTABLE HEADACHE, UNSPECIFIED CHRONICITY PATTERN, UNSPECIFIED HEADACHE TYPE: ICD-10-CM

## 2024-11-14 RX ORDER — OMEPRAZOLE 40 MG/1
40 CAPSULE, DELAYED RELEASE ORAL DAILY
Qty: 90 CAPSULE | Refills: 0 | Status: SHIPPED | OUTPATIENT
Start: 2024-11-14

## 2024-11-14 NOTE — PROGRESS NOTES
Chief Complaint  Contraception (Discuss IUD. Patient has used the Depo shot in the past and didn't like how it made her bleed a lot. Not currently doing any birth control now. Needing GYN referral. Does not have GYN currently. ), Headache (persistent headaches for 3 days- missed 1 Lexapro pill possibly from this?), Nausea (Hard time keeping food down. it hurts in the middle of her chest and makes her left arm hurt- 5 days now. With vomiting.), and Vomiting (5 days)      Subjective      History of Present Illness  The patient is a 19-year-old female who presents today to discuss a GYN referral for an IUD.    She has been experiencing headaches for the past few days, which she attributes to missing a dose of Lexapro. These headaches have been disrupting her sleep and affecting her mood. She has not taken any medication for the headaches. Additionally, she reports needing a new prescription for her eyeglasses.    She has been dealing with nausea and vomiting for the past 3 to 4 days, which has made it difficult for her to keep food down. She reports no changes in her condition over the past few days. She underwent an upper endoscopy in 06/2023 with Dr. Mendoza, which improved her symptoms. She continues to take Pepcid. She reports no diarrhea or constipation. She feels better after vomiting, which usually occurs after eating.    She has been taking Latuda at night to help with sleep, as Lexapro was causing insomnia. However, she has been experiencing intense nightmares since starting Latuda. She is concerned that Latuda may be causing these nightmares.    FAMILY HISTORY  Her mother also had issues with Lexapro when she missed a day of it. Her father has bipolar disorder.         Objective   Vital Signs:   Vitals:    11/14/24 0806   BP: 120/74   BP Location: Left arm   Patient Position: Sitting   Cuff Size: Adult   Pulse: 82   Resp: 18   Temp: 98.2 °F (36.8 °C)   TempSrc: Temporal   SpO2: 100%   Weight: 84.4 kg (186 lb)  "  Height: 180.3 cm (70.98\")     Body mass index is 25.96 kg/m².    Wt Readings from Last 3 Encounters:   11/14/24 84.4 kg (186 lb)   10/23/24 77.6 kg (171 lb)   09/12/24 71.7 kg (158 lb)     BP Readings from Last 3 Encounters:   11/14/24 120/74   10/23/24 128/82   09/12/24 124/72       Health Maintenance   Topic Date Due    HPV VACCINES (2 - 2-dose series) 05/07/2017    HEPATITIS C SCREENING  Never done    ANNUAL PHYSICAL  Never done    CHLAMYDIA SCREENING  Never done    TDAP/TD VACCINES (2 - Td or Tdap) 07/01/2025    BMI FOLLOWUP  11/14/2025    COVID-19 Vaccine  Completed    Pneumococcal Vaccine 0-64  Completed    INFLUENZA VACCINE  Completed    MENINGOCOCCAL VACCINE  Completed       Physical Exam  Vitals and nursing note reviewed.   Constitutional:       General: She is not in acute distress.     Appearance: Normal appearance.   HENT:      Head: Normocephalic and atraumatic.      Right Ear: External ear normal.      Left Ear: External ear normal.      Nose: Nose normal.      Mouth/Throat:      Mouth: Mucous membranes are moist.   Eyes:      Conjunctiva/sclera: Conjunctivae normal.   Cardiovascular:      Rate and Rhythm: Normal rate and regular rhythm.      Pulses: Normal pulses.      Heart sounds: Normal heart sounds. No murmur heard.     No friction rub. No gallop.   Pulmonary:      Effort: Pulmonary effort is normal. No respiratory distress.      Breath sounds: No wheezing, rhonchi or rales.   Musculoskeletal:      Cervical back: Neck supple.      Right lower leg: No edema.      Left lower leg: No edema.   Skin:     General: Skin is warm and dry.   Neurological:      General: No focal deficit present.      Mental Status: She is alert and oriented to person, place, and time.   Psychiatric:         Mood and Affect: Mood normal.         Behavior: Behavior normal.        Physical Exam        Result Review :  The following data was reviewed by: ARIANA King on 11/14/2024:         Results          "     Procedures            Assessment & Plan  IUD contraception    Orders:    Ambulatory Referral to Gynecology    Nonintractable headache, unspecified chronicity pattern, unspecified headache type         Nausea and vomiting, unspecified vomiting type              Assessment & Plan  1. Headache.  She has been experiencing headaches for the past few days, likely triggered by missing a dose of Lexapro. The headaches are not severe but are affecting her sleep and mood. She has been advised to take Tylenol or ibuprofen for pain relief. The use of an ice pack, heating pad, or hot water bottle has been suggested depending on her preference. She has been advised to avoid blue light exposure until the headache subsides.    2. Nausea and vomiting.  She has been experiencing nausea and vomiting for the past three to four days, which has made it difficult to keep food down. She had an upper scope last year with Dr. Mendoza, which improved her symptoms at that time. Omeprazole has been prescribed for a month, and she has been advised to continue taking Pepcid once daily, alternating between morning and night. A low acid diet has been recommended, avoiding acidic foods such as tomato-based products and citrus fruits. If symptoms do not improve, she may need to follow up with a GI specialist.    3. IUD placement.  A referral to gynecology has been made to discuss IUD options. She will be set up with Dr. Bryant at [facility] for an initial consultation, where different IUD options will be discussed. The actual placement will be scheduled during a subsequent visit.    4. Nightmares.  She has been experiencing intense nightmares after starting Latuda. She has been advised to contact her psychiatrist, Hafsa, for further evaluation and management of this side effect. She can call the office or use [facility] to send a message.    Medication Management.  A prescription for omeprazole has been sent to [facility]. She will continue  taking Pepcid as previously instructed.    Follow-up  Return in a couple of months for follow-up.    Patient or patient representative verbalized consent for the use of Ambient Listening during the visit with  ARIANA King for chart documentation. 11/14/2024  10:35 EST      FOLLOW UP  Return in about 2 months (around 1/14/2025).  Patient was given instructions and counseling regarding her condition or for health maintenance advice. Please see specific information pulled into the AVS if appropriate.     ARIANA King  11/14/24  10:35 EST    CURRENT & DISCONTINUED MEDICATIONS  Current Outpatient Medications   Medication Instructions    escitalopram (LEXAPRO) 5 mg, Oral, Daily    famotidine (PEPCID) 40 mg, Oral, Daily    hydrOXYzine (ATARAX) 25 mg, Oral, 3 Times Daily PRN    Lurasidone HCl (LATUDA) 20 mg, Oral, Daily    omeprazole (PRILOSEC) 40 mg, Oral, Daily       There are no discontinued medications.

## 2024-11-25 RX ORDER — FAMOTIDINE 40 MG/1
40 TABLET, FILM COATED ORAL DAILY
Qty: 90 TABLET | Refills: 0 | Status: SHIPPED | OUTPATIENT
Start: 2024-11-25

## 2024-11-28 ENCOUNTER — HOSPITAL ENCOUNTER (EMERGENCY)
Facility: HOSPITAL | Age: 19
Discharge: HOME | End: 2024-11-28
Attending: EMERGENCY MEDICINE | Admitting: EMERGENCY MEDICINE
Payer: OTHER GOVERNMENT

## 2024-11-28 VITALS
OXYGEN SATURATION: 100 % | HEIGHT: 70 IN | BODY MASS INDEX: 27.05 KG/M2 | HEART RATE: 88 BPM | DIASTOLIC BLOOD PRESSURE: 75 MMHG | TEMPERATURE: 98.1 F | RESPIRATION RATE: 16 BRPM | WEIGHT: 188.93 LBS | SYSTOLIC BLOOD PRESSURE: 126 MMHG

## 2024-11-28 DIAGNOSIS — Z32.02 NEGATIVE PREGNANCY TEST: Primary | ICD-10-CM

## 2024-11-28 LAB
BILIRUB UR QL STRIP: NEGATIVE
CLARITY UR: CLEAR
COLOR UR: YELLOW
GLUCOSE UR STRIP-MCNC: NEGATIVE MG/DL
HCG INTACT+B SERPL-ACNC: <0.5 MIU/ML
HGB UR QL STRIP.AUTO: NEGATIVE
KETONES UR QL STRIP: NEGATIVE
LEUKOCYTE ESTERASE UR QL STRIP.AUTO: NEGATIVE
NITRITE UR QL STRIP: NEGATIVE
PH UR STRIP.AUTO: 6 [PH] (ref 5–8)
PROT UR QL STRIP: NEGATIVE
SP GR UR STRIP: 1.02 (ref 1–1.03)
UROBILINOGEN UR QL STRIP: NORMAL

## 2024-11-28 PROCEDURE — 81003 URINALYSIS AUTO W/O SCOPE: CPT | Performed by: REGISTERED NURSE

## 2024-11-28 PROCEDURE — 99283 EMERGENCY DEPT VISIT LOW MDM: CPT

## 2024-11-28 PROCEDURE — 84702 CHORIONIC GONADOTROPIN TEST: CPT | Performed by: EMERGENCY MEDICINE

## 2024-11-28 PROCEDURE — 36415 COLL VENOUS BLD VENIPUNCTURE: CPT

## 2024-11-28 NOTE — ED PROVIDER NOTES
"Time: 9:53 AM EST  Date of encounter:  11/28/2024  Independent Historian/Clinical History and Information was obtained by:   Patient    History is limited by: N/A    Chief Complaint: Low back pain, lower abdominal cramping, positive pregnancy test      History of Present Illness:  Patient is a 19 y.o. year old female who presents to the emergency department accompanied by her  for evaluation after a positive pregnancy test at home.  Patient is unsure of her last period.  States \"I think I had a period 2 weeks ago because all ran out of pads but I am not sure.\"  She does not track her menstrual cycle.  She also complains of lower abdominal cramping and low back pain for about a week.  She denies painful urination, fever/chills, nausea or vomiting.  Patient had a Depo-Provera shot at the end of August.      Patient Care Team  Primary Care Provider: Nena Arreola APRN    Past Medical History:     Allergies   Allergen Reactions    Shellfish Allergy Anaphylaxis    Iodine Anxiety     Past Medical History:   Diagnosis Date    Anxiety     Nausea and vomiting 06/08/2023    Panic disorder     Self-injurious behavior     Shellfish allergy     Suicide attempt     Last attempt 2020     Past Surgical History:   Procedure Laterality Date    COLONOSCOPY N/A 6/14/2023    Procedure: COLONOSCOPY;  Surgeon: Pj Mendoza MD;  Location: [facility] ENDOSCOPY;  Service: Gastroenterology;  Laterality: N/A;  normal colon    ENDOSCOPY N/A 6/14/2023    Procedure: ESOPHAGOGASTRODUODENOSCOPY  with biopsies;  Surgeon: Pj Mendoza MD;  Location: [facility] ENDOSCOPY;  Service: Gastroenterology;  Laterality: N/A;  normal egd     Family History   Problem Relation Age of Onset    Self-Injurious Behavior  Mother     Anxiety disorder Mother     Suicide Attempts Father     Depression Father     Bipolar disorder Father     Anxiety disorder Sister     Depression Sister     Colon cancer Neg Hx        Home Medications:  Prior to " "Admission medications    Medication Sig Start Date End Date Taking? Authorizing Provider   escitalopram (Lexapro) 5 MG tablet Take 1 tablet by mouth Daily. 10/16/24   Josi Stoner APRN   famotidine (PEPCID) 40 MG tablet TAKE 1 TABLET BY MOUTH DAILY 11/25/24   Nena Arreola APRN   hydrOXYzine (ATARAX) 25 MG tablet Take 1 tablet by mouth 3 (Three) Times a Day As Needed for Itching. 8/29/24   Nena Arreola APRN   Lurasidone HCl (LATUDA) 20 MG tablet tablet Take 1 tablet by mouth Daily. 10/23/24   Josi Stoner APRN   omeprazole (priLOSEC) 40 MG capsule Take 1 capsule by mouth Daily. 11/14/24   Nena Arreola APRN        Social History:   Social History     Tobacco Use    Smoking status: Former     Current packs/day: 0.25     Average packs/day: 0.3 packs/day for 1.2 years (0.3 ttl pk-yrs)     Types: Cigarettes     Start date: 9/12/2023     Passive exposure: Never    Smokeless tobacco: Never    Tobacco comments:     3 cigs a week   Vaping Use    Vaping status: Every Day    Substances: Nicotine, Flavoring    Devices: Disposable    Passive vaping exposure: Yes   Substance Use Topics    Alcohol use: Not Currently    Drug use: Not Currently     Types: Marijuana, Psilocybin         Review of Systems:  Review of Systems   I performed a 10 point review of systems which was all negative, except for the positives found in the HPI above.  Physical Exam:  /75 (BP Location: Left arm, Patient Position: Sitting)   Pulse 88   Temp 98.1 °F (36.7 °C) (Oral)   Resp 16   Ht 177.8 cm (70\")   Wt 85.7 kg (188 lb 15 oz)   LMP 11/11/2024 (Exact Date)   SpO2 100%   BMI 27.11 kg/m²     Physical Exam     General: Awake alert and in no acute distress     HEENT: Head normocephalic atraumatic, eyes PERRLA EOMI, nose normal, oropharynx normal.     Neck: Supple full range of motion, no meningismus, no lymphadenopathy     Heart: Regular rate and rhythm, no murmurs or rubs, 2+ radial pulses bilaterally     Lungs: Clear to " auscultation bilaterally without wheezes or crackles, no respiratory distress     Abdomen: Soft, suprapubic tenderness, nondistended, no rebound or guarding     Back exam:  No L-spine tenderness.  No rash.     Skin: Warm, dry, no rash     Musculoskeletal: Normal range of motion, no lower extremity edema     Neurologic: Oriented x3, no motor deficits no sensory deficits     Psychiatric: Mood appears stable, no psychosis          Procedures:  Procedures      Medical Decision Making:      Comorbidities that affect care:    None    External Notes reviewed:    Previous Clinic Note: PCP visit on 11/14/2024 for IUD, headache, nausea vomiting      The following orders were placed and all results were independently analyzed by me:  Orders Placed This Encounter   Procedures    hCG, Quantitative, Pregnancy    Urinalysis With Culture If Indicated - Urine, Clean Catch       Medications Given in the Emergency Department:  Medications - No data to display     ED Course:         Labs:    Lab Results (last 24 hours)       Procedure Component Value Units Date/Time    hCG, Quantitative, Pregnancy [[id number]] Collected: 11/28/24 0954    Specimen: Blood Updated: 11/28/24 1020     HCG Quantitative <0.50 mIU/mL     Narrative:      HCG Ranges by Gestational Age    Females - non-pregnant premenopausal   </= 1mIU/mL HCG  Females - postmenopausal               </= 7mIU/mL HCG    3 Weeks       5.4   -      72 mIU/mL  4 Weeks      10.2   -     708 mIU/mL  5 Weeks       217   -   8,245 mIU/mL  6 Weeks       152   -  32,177 mIU/mL  7 Weeks     4,059   - 153,767 mIU/mL  8 Weeks    31,366   - 149,094 mIU/mL  9 Weeks    59,109   - 135,901 mIU/mL  10 Weeks   44,186   - 170,409 mIU/mL  12 Weeks   27,107   - 201,615 mIU/mL  14 Weeks   24,302   -  93,646 mIU/mL  15 Weeks   12,540   -  69,747 mIU/mL  16 Weeks    8,904   -  55,332 mIU/mL  17 Weeks    8,240   -  51,793 mIU/mL  18 Weeks    9,649   -  55,271 mIU/mL      Urinalysis With Culture If Indicated -  Urine, Clean Catch [[id number]]  (Normal) Collected: 11/28/24 1019    Specimen: Urine, Clean Catch Updated: 11/28/24 1025     Color, UA Yellow     Appearance, UA Clear     pH, UA 6.0     Specific Gravity, UA 1.020     Glucose, UA Negative     Ketones, UA Negative     Bilirubin, UA Negative     Blood, UA Negative     Protein, UA Negative     Leuk Esterase, UA Negative     Nitrite, UA Negative     Urobilinogen, UA 0.2 E.U./dL    Narrative:      In absence of clinical symptoms, the presence of pyuria, bacteria, and/or nitrites on the urinalysis result does not correlate with infection.  Urine microscopic not indicated.             Imaging:    No Radiology Exams Resulted Within Past 24 Hours      Differential Diagnosis and Discussion:    Abdominal Pain: Based on the patient's signs and symptoms, I considered abdominal aortic aneurysm, small bowel obstruction, pancreatitis, acute cholecystitis, acute appendecitis, peptic ulcer disease, gastritis, colitis, endocrine disorders, irritable bowel syndrome and other differential diagnosis an etiology of the patient's abdominal pain.    All labs were reviewed and interpreted by me.    MDM  Number of Diagnoses or Management Options  Negative pregnancy test  Diagnosis management comments: Pregnancy test was negative.  Urinalysis is negative for hematuria, ketonuria and bacteriuria. I do not believe that the patient has an acute emergency medical condition requiring additional emergency management at this time. The patient is currently stable for outpatient treatment and continuation of care. Important signs and symptoms that would warrant return to the emergency department were reviewed. The patient was provided the opportunity to ask questions. All questions were addressed and the patient was discharged from the ED. The patient demonstrated understanding and agreed to plan.         Amount and/or Complexity of Data Reviewed  Clinical lab tests: reviewed and ordered    Risk of  Complications, Morbidity, and/or Mortality  Presenting problems: minimal  Diagnostic procedures: minimal  Management options: minimal    Patient Progress  Patient progress: stable                       Patient Care Considerations:    SEPSIS was considered but is NOT present in the emergency department as SIRS criteria is not present.      Consultants/Shared Management Plan:        Social Determinants of Health:    Patient is independent, reliable, and has access to care.       Disposition and Care Coordination:    Discharged: The patient is suitable and stable for discharge with no need for consideration of admission.    I have explained discharge medications and the need for follow up with the patient/caretakers. This was also printed in the discharge instructions. Patient was discharged with the following medications and follow up:      Medication List      No changes were made to your prescriptions during this visit.      Nena Arreola, APRN  [address] [address] [phone]    Call   As needed       Final diagnoses:   Negative pregnancy test        ED Disposition       ED Disposition   Discharge    Condition   Stable    Comment   --               This medical record created using voice recognition software.             Radha Ross, APRN  11/28/24 2490

## 2024-11-28 NOTE — DISCHARGE INSTRUCTIONS
Your pregnancy test was negative.  There is no sign of infection in your urine.    Please follow-up with your PCP regarding contraception.    Return for worsening symptoms or any new concerns.

## 2024-12-02 ENCOUNTER — TELEPHONE (OUTPATIENT)
Dept: INTERNAL MEDICINE | Facility: CLINIC | Age: 19
End: 2024-12-02
Payer: OTHER GOVERNMENT

## 2024-12-02 RX ORDER — NORETHINDRONE ACETATE AND ETHINYL ESTRADIOL .02; 1 MG/1; MG/1
1 TABLET ORAL DAILY
Qty: 90 TABLET | Refills: 3 | Status: SHIPPED | OUTPATIENT
Start: 2024-12-02 | End: 2025-12-02

## 2024-12-02 NOTE — TELEPHONE ENCOUNTER
Called and spoke with pt's mother, explained to pt's mother medication has been sent to the pharmacy, pt's mother had no further questions at this time.

## 2024-12-02 NOTE — TELEPHONE ENCOUNTER
Pt's mother called in office today wondering if we can send in birth control for pt before she goes back to school, pt's mother stated pt's Depo shot has ended and pt is not on anything right now, I explained  to pt's mother we did put in a referral for GYN and they had tried to call pt and schedule pt but were unsuccessful, I provided pt's mother with number for GYN so she could call and schedule pt, but pt's mother is wondering if we could send in birth control pills for pt to take until she can get into ob. Pt's mother stated pt is going back to school and does not want pt to not be on any birth control.

## 2024-12-15 ENCOUNTER — HOSPITAL ENCOUNTER (EMERGENCY)
Facility: HOSPITAL | Age: 19
Discharge: HOME | End: 2024-12-15
Attending: EMERGENCY MEDICINE | Admitting: EMERGENCY MEDICINE
Payer: OTHER GOVERNMENT

## 2024-12-15 VITALS
HEIGHT: 70 IN | RESPIRATION RATE: 20 BRPM | TEMPERATURE: 98.6 F | HEART RATE: 93 BPM | BODY MASS INDEX: 26.92 KG/M2 | WEIGHT: 188 LBS | DIASTOLIC BLOOD PRESSURE: 70 MMHG | OXYGEN SATURATION: 100 % | SYSTOLIC BLOOD PRESSURE: 115 MMHG

## 2024-12-15 DIAGNOSIS — Z32.02 NEGATIVE PREGNANCY TEST: Primary | ICD-10-CM

## 2024-12-15 LAB
BILIRUB UR QL STRIP: NEGATIVE
CLARITY UR: CLEAR
COLOR UR: YELLOW
GLUCOSE UR STRIP-MCNC: NEGATIVE MG/DL
HCG INTACT+B SERPL-ACNC: <0.5 MIU/ML
HGB UR QL STRIP.AUTO: NEGATIVE
KETONES UR QL STRIP: NEGATIVE
LEUKOCYTE ESTERASE UR QL STRIP.AUTO: NEGATIVE
NITRITE UR QL STRIP: NEGATIVE
PH UR STRIP.AUTO: 6 [PH] (ref 5–8)
PROT UR QL STRIP: NEGATIVE
SP GR UR STRIP: >=1.03 (ref 1–1.03)
UROBILINOGEN UR QL STRIP: NORMAL

## 2024-12-15 PROCEDURE — 81003 URINALYSIS AUTO W/O SCOPE: CPT

## 2024-12-15 PROCEDURE — 36415 COLL VENOUS BLD VENIPUNCTURE: CPT

## 2024-12-15 PROCEDURE — 99283 EMERGENCY DEPT VISIT LOW MDM: CPT

## 2024-12-15 PROCEDURE — 84702 CHORIONIC GONADOTROPIN TEST: CPT

## 2024-12-15 NOTE — DISCHARGE INSTRUCTIONS
Your pregnancy test completed in the emergency department today is negative and your urine does not show any signs of infection.  Return to the emergency department for new or worsening symptoms.

## 2024-12-15 NOTE — ED PROVIDER NOTES
Time: 1:19 PM EST  Date of encounter:  12/15/2024  Independent Historian/Clinical History and Information was obtained by:   Patient    History is limited by: N/A    Chief Complaint: Possible pregnancy      History of Present Illness:  Patient is a 19 y.o. year old female who presents to the emergency department for evaluation of possible pregnancy.  Patient is presenting to the emergency department today requesting a pregnancy test.  She is not taking any pregnancy test recently at home.  She states that her last menstrual cycle was 2024.  She was previously on Depo-Provera shot and states that her last injection was 3 months ago and then she started contraceptive pill approximately 2 to 3 days ago.  She states that she has had some breast tenderness and fatigue.  Patient denies nausea, vomiting, dysuria, vaginal bleeding.  Patient historically .      Patient Care Team  Primary Care Provider: Nena Arreola APRN    Past Medical History:     Allergies   Allergen Reactions    Shellfish Allergy Anaphylaxis    Iodine Anxiety     Past Medical History:   Diagnosis Date    Anxiety     Nausea and vomiting 2023    Panic disorder     Self-injurious behavior     Shellfish allergy     Suicide attempt     Last attempt      Past Surgical History:   Procedure Laterality Date    COLONOSCOPY N/A 2023    Procedure: COLONOSCOPY;  Surgeon: Pj Mendoza MD;  Location: [facility];  Service: Gastroenterology;  Laterality: N/A;  normal colon    ENDOSCOPY N/A 2023    Procedure: ESOPHAGOGASTRODUODENOSCOPY  with biopsies;  Surgeon: Pj Mendoza MD;  Location: [facility];  Service: Gastroenterology;  Laterality: N/A;  normal egd     Family History   Problem Relation Age of Onset    Self-Injurious Behavior  Mother     Anxiety disorder Mother     Suicide Attempts Father     Depression Father     Bipolar disorder Father     Anxiety disorder Sister     Depression Sister     Colon  "cancer Neg Hx        Home Medications:  Prior to Admission medications    Medication Sig Start Date End Date Taking? Authorizing Provider   escitalopram (Lexapro) 5 MG tablet Take 1 tablet by mouth Daily. 10/16/24   Josi Stoner APRN   famotidine (PEPCID) 40 MG tablet TAKE 1 TABLET BY MOUTH DAILY 11/25/24   Nena Arreola APRN   hydrOXYzine (ATARAX) 25 MG tablet Take 1 tablet by mouth 3 (Three) Times a Day As Needed for Itching. 8/29/24   Nena Arreola APRN   Lurasidone HCl (LATUDA) 20 MG tablet tablet Take 1 tablet by mouth Daily. 10/23/24   Josi Stoner APRN   norethindrone-ethinyl estradiol (Loestrin 1/20, 21,) 1-20 MG-MCG per tablet Take 1 tablet by mouth Daily. 12/2/24 12/2/25  Nena Arreola APRN   omeprazole (priLOSEC) 40 MG capsule Take 1 capsule by mouth Daily. 11/14/24   Nena Arreola APRN        Social History:   Social History     Tobacco Use    Smoking status: Former     Current packs/day: 0.25     Average packs/day: 0.3 packs/day for 1.3 years (0.3 ttl pk-yrs)     Types: Cigarettes     Start date: 9/12/2023     Passive exposure: Never    Smokeless tobacco: Never    Tobacco comments:     3 cigs a week   Vaping Use    Vaping status: Every Day    Substances: Nicotine, Flavoring    Devices: Disposable    Passive vaping exposure: Yes   Substance Use Topics    Alcohol use: Not Currently    Drug use: Not Currently     Types: Marijuana, Psilocybin         Review of Systems:  Review of Systems   Constitutional:  Positive for fatigue.   Gastrointestinal:  Negative for abdominal pain, nausea and vomiting.   Genitourinary:  Negative for dysuria, frequency and vaginal bleeding.        Physical Exam:  /70 (BP Location: Left arm, Patient Position: Sitting)   Pulse 93   Temp 98.6 °F (37 °C) (Oral)   Resp 20   Ht 177.8 cm (70\")   Wt 85.3 kg (188 lb)   LMP 11/14/2024   SpO2 100%   BMI 26.98 kg/m²     Physical Exam  Vitals and nursing note reviewed.   Constitutional:       Appearance: Normal " appearance.   HENT:      Head: Normocephalic and atraumatic.      Nose: Nose normal.   Eyes:      Conjunctiva/sclera: Conjunctivae normal.   Cardiovascular:      Rate and Rhythm: Normal rate and regular rhythm.      Heart sounds: Normal heart sounds.   Pulmonary:      Effort: Pulmonary effort is normal.      Breath sounds: Normal breath sounds.   Abdominal:      General: Abdomen is flat. Bowel sounds are normal. There is no distension.      Palpations: Abdomen is soft. There is no mass.      Tenderness: There is no abdominal tenderness. There is no guarding or rebound.      Hernia: No hernia is present.   Musculoskeletal:         General: Normal range of motion.      Cervical back: Normal range of motion and neck supple.   Skin:     General: Skin is warm and dry.   Neurological:      General: No focal deficit present.      Mental Status: She is alert and oriented to person, place, and time.   Psychiatric:         Mood and Affect: Mood normal.         Behavior: Behavior normal.         Thought Content: Thought content normal.         Judgment: Judgment normal.                  Medical Decision Making:    Comorbidities that affect care:    None    External Notes reviewed:    Previous Labs: hCG completed on 11-      The following orders were placed and all results were independently analyzed by me:  Orders Placed This Encounter   Procedures    hCG, Quantitative, Pregnancy    Urinalysis With Microscopic If Indicated (No Culture) - Urine, Clean Catch       Medications Given in the Emergency Department:  Medications - No data to display     ED Course:         Labs:    Lab Results (last 24 hours)       Procedure Component Value Units Date/Time    Urinalysis With Microscopic If Indicated (No Culture) - Urine, Clean Catch [[id number]]  (Normal) Collected: 12/15/24 1332    Specimen: Urine, Clean Catch Updated: 12/15/24 1347     Color, UA Yellow     Appearance, UA Clear     pH, UA 6.0     Specific Gravity, UA >=1.030      Glucose, UA Negative     Ketones, UA Negative     Bilirubin, UA Negative     Blood, UA Negative     Protein, UA Negative     Leuk Esterase, UA Negative     Nitrite, UA Negative     Urobilinogen, UA 1.0 E.U./dL    Narrative:      Urine microscopic not indicated.    hCG, Quantitative, Pregnancy [[id number]] Collected: 12/15/24 1342    Specimen: Blood Updated: 12/15/24 1443     HCG Quantitative <0.50 mIU/mL     Narrative:      HCG Ranges by Gestational Age    Females - non-pregnant premenopausal   </= 1mIU/mL HCG  Females - postmenopausal               </= 7mIU/mL HCG    3 Weeks       5.4   -      72 mIU/mL  4 Weeks      10.2   -     708 mIU/mL  5 Weeks       217   -   8,245 mIU/mL  6 Weeks       152   -  32,177 mIU/mL  7 Weeks     4,059   - 153,767 mIU/mL  8 Weeks    31,366   - 149,094 mIU/mL  9 Weeks    59,109   - 135,901 mIU/mL  10 Weeks   44,186   - 170,409 mIU/mL  12 Weeks   27,107   - 201,615 mIU/mL  14 Weeks   24,302   -  93,646 mIU/mL  15 Weeks   12,540   -  69,747 mIU/mL  16 Weeks    8,904   -  55,332 mIU/mL  17 Weeks    8,240   -  51,793 mIU/mL  18 Weeks    9,649   -  55,271 mIU/mL               Imaging:    No Radiology Exams Resulted Within Past 24 Hours      Differential Diagnosis and Discussion:    Amenorrhea: Differential diagnosis includes but is not limited to irregular menstruation, menopause, pregnancy    PROCEDURES:    Labs were drawn in the emergency department and all labs were reviewed and interpreted by me.    No orders to display       Procedures    MDM  Number of Diagnoses or Management Options  Negative pregnancy test  Diagnosis management comments: Patient presented to the emergency department today requesting pregnancy test.  Urinalysis is negative for any UTI.  hCG was completed and less than 0.5 this is negative for pregnancy.  These results were discussed with patient.  Return to the emergency department guidelines provided.       Amount and/or Complexity of Data Reviewed  Clinical lab  tests: reviewed and ordered    Risk of Complications, Morbidity, and/or Mortality  Presenting problems: moderate  Diagnostic procedures: low  Management options: low    Patient Progress  Patient progress: stable       Patient Care Considerations:    ANTIBIOTICS: I considered prescribing antibiotics as an outpatient however no bacterial focus of infection was found.      Consultants/Shared Management Plan:    None    Social Determinants of Health:    Patient is independent, reliable, and has access to care.       Disposition and Care Coordination:    Discharged: The patient is suitable and stable for discharge with no need for consideration of admission.    I have explained the patient´s condition, diagnoses and treatment plan based on the information available to me at this time. I have answered questions and addressed any concerns. The patient has a good  understanding of the patient´s diagnosis, condition, and treatment plan as can be expected at this point. The vital signs have been stable. The patient´s condition is stable and appropriate for discharge from the emergency department.      The patient will pursue further outpatient evaluation with the primary care physician or other designated or consulting physician as outlined in the discharge instructions. They are agreeable to this plan of care and follow-up instructions have been explained in detail. The patient has received these instructions in written format and has expressed an understanding of the discharge instructions. The patient is aware that any significant change in condition or worsening of symptoms should prompt an immediate return to this or the closest emergency department or call to 911.  I have explained discharge medications and the need for follow up with the patient/caretakers. This was also printed in the discharge instructions. Patient was discharged with the following medications and follow up:      Medication List      No changes were  made to your prescriptions during this visit.      Nena Arreola, APRN  [address] [id number]  [phone]             Final diagnoses:   Negative pregnancy test        ED Disposition       ED Disposition   Discharge    Condition   Stable    Comment   --               This medical record created using voice recognition software.             Geovanny Ruffin PA-C  12/15/24 1458

## 2025-02-23 ENCOUNTER — HOSPITAL ENCOUNTER (EMERGENCY)
Facility: HOSPITAL | Age: 20
Discharge: LEFT BEFORE DISCHARGE | End: 2025-02-23
Attending: EMERGENCY MEDICINE
Payer: OTHER GOVERNMENT

## 2025-02-23 ENCOUNTER — APPOINTMENT (OUTPATIENT)
Dept: GENERAL RADIOLOGY | Facility: HOSPITAL | Age: 20
End: 2025-02-23
Payer: OTHER GOVERNMENT

## 2025-02-23 VITALS
HEIGHT: 70 IN | HEART RATE: 77 BPM | SYSTOLIC BLOOD PRESSURE: 139 MMHG | TEMPERATURE: 98.6 F | WEIGHT: 209.22 LBS | BODY MASS INDEX: 29.95 KG/M2 | DIASTOLIC BLOOD PRESSURE: 91 MMHG | OXYGEN SATURATION: 100 % | RESPIRATION RATE: 20 BRPM

## 2025-02-23 LAB
ALBUMIN SERPL-MCNC: 4.1 G/DL (ref 3.5–5.2)
ALBUMIN/GLOB SERPL: 1.2 G/DL
ALP SERPL-CCNC: 73 U/L (ref 39–117)
ALT SERPL W P-5'-P-CCNC: 6 U/L (ref 1–33)
ANION GAP SERPL CALCULATED.3IONS-SCNC: 8.4 MMOL/L (ref 5–15)
AST SERPL-CCNC: 15 U/L (ref 1–32)
BASOPHILS # BLD AUTO: 0.05 10*3/MM3 (ref 0–0.2)
BASOPHILS NFR BLD AUTO: 0.8 % (ref 0–1.5)
BILIRUB SERPL-MCNC: <0.2 MG/DL (ref 0–1.2)
BUN SERPL-MCNC: 12 MG/DL (ref 6–20)
BUN/CREAT SERPL: 16.7 (ref 7–25)
CALCIUM SPEC-SCNC: 9.4 MG/DL (ref 8.6–10.5)
CHLORIDE SERPL-SCNC: 105 MMOL/L (ref 98–107)
CO2 SERPL-SCNC: 22.6 MMOL/L (ref 22–29)
CREAT SERPL-MCNC: 0.72 MG/DL (ref 0.57–1)
DEPRECATED RDW RBC AUTO: 44.2 FL (ref 37–54)
EGFRCR SERPLBLD CKD-EPI 2021: 122.9 ML/MIN/1.73
EOSINOPHIL # BLD AUTO: 0.23 10*3/MM3 (ref 0–0.4)
EOSINOPHIL NFR BLD AUTO: 3.6 % (ref 0.3–6.2)
ERYTHROCYTE [DISTWIDTH] IN BLOOD BY AUTOMATED COUNT: 15.6 % (ref 12.3–15.4)
FLUAV SUBTYP SPEC NAA+PROBE: NOT DETECTED
FLUBV RNA ISLT QL NAA+PROBE: NOT DETECTED
GLOBULIN UR ELPH-MCNC: 3.3 GM/DL
GLUCOSE SERPL-MCNC: 109 MG/DL (ref 65–99)
HCT VFR BLD AUTO: 37 % (ref 34–46.6)
HGB BLD-MCNC: 11.2 G/DL (ref 12–15.9)
HOLD SPECIMEN: NORMAL
HOLD SPECIMEN: NORMAL
IMM GRANULOCYTES # BLD AUTO: 0.01 10*3/MM3 (ref 0–0.05)
IMM GRANULOCYTES NFR BLD AUTO: 0.2 % (ref 0–0.5)
LIPASE SERPL-CCNC: 74 U/L (ref 13–60)
LYMPHOCYTES # BLD AUTO: 1.96 10*3/MM3 (ref 0.7–3.1)
LYMPHOCYTES NFR BLD AUTO: 30.8 % (ref 19.6–45.3)
MAGNESIUM SERPL-MCNC: 1.9 MG/DL (ref 1.7–2.2)
MCH RBC QN AUTO: 23.6 PG (ref 26.6–33)
MCHC RBC AUTO-ENTMCNC: 30.3 G/DL (ref 31.5–35.7)
MCV RBC AUTO: 77.9 FL (ref 79–97)
MONOCYTES # BLD AUTO: 0.63 10*3/MM3 (ref 0.1–0.9)
MONOCYTES NFR BLD AUTO: 9.9 % (ref 5–12)
NEUTROPHILS NFR BLD AUTO: 3.49 10*3/MM3 (ref 1.7–7)
NEUTROPHILS NFR BLD AUTO: 54.7 % (ref 42.7–76)
NRBC BLD AUTO-RTO: 0 /100 WBC (ref 0–0.2)
NT-PROBNP SERPL-MCNC: <36 PG/ML (ref 0–450)
PLATELET # BLD AUTO: 226 10*3/MM3 (ref 140–450)
PMV BLD AUTO: 10.5 FL (ref 6–12)
POTASSIUM SERPL-SCNC: 4.1 MMOL/L (ref 3.5–5.2)
PROT SERPL-MCNC: 7.4 G/DL (ref 6–8.5)
QT INTERVAL: 366 MS
QTC INTERVAL: 423 MS
RBC # BLD AUTO: 4.75 10*6/MM3 (ref 3.77–5.28)
RSV RNA NPH QL NAA+NON-PROBE: NOT DETECTED
SARS-COV-2 RNA RESP QL NAA+PROBE: NOT DETECTED
SODIUM SERPL-SCNC: 136 MMOL/L (ref 136–145)
TROPONIN T SERPL HS-MCNC: <6 NG/L
WBC NRBC COR # BLD AUTO: 6.37 10*3/MM3 (ref 3.4–10.8)
WHOLE BLOOD HOLD COAG: NORMAL
WHOLE BLOOD HOLD SPECIMEN: NORMAL

## 2025-02-23 PROCEDURE — 83735 ASSAY OF MAGNESIUM: CPT

## 2025-02-23 PROCEDURE — 84484 ASSAY OF TROPONIN QUANT: CPT

## 2025-02-23 PROCEDURE — 93005 ELECTROCARDIOGRAM TRACING: CPT

## 2025-02-23 PROCEDURE — 80053 COMPREHEN METABOLIC PANEL: CPT

## 2025-02-23 PROCEDURE — 36415 COLL VENOUS BLD VENIPUNCTURE: CPT

## 2025-02-23 PROCEDURE — 87637 SARSCOV2&INF A&B&RSV AMP PRB: CPT

## 2025-02-23 PROCEDURE — 99211 OFF/OP EST MAY X REQ PHY/QHP: CPT | Performed by: EMERGENCY MEDICINE

## 2025-02-23 PROCEDURE — 83690 ASSAY OF LIPASE: CPT

## 2025-02-23 PROCEDURE — 85025 COMPLETE CBC W/AUTO DIFF WBC: CPT

## 2025-02-23 PROCEDURE — 71045 X-RAY EXAM CHEST 1 VIEW: CPT

## 2025-02-23 PROCEDURE — 83880 ASSAY OF NATRIURETIC PEPTIDE: CPT

## 2025-02-23 PROCEDURE — 93005 ELECTROCARDIOGRAM TRACING: CPT | Performed by: EMERGENCY MEDICINE

## 2025-02-23 RX ORDER — ASPIRIN 81 MG/1
324 TABLET, CHEWABLE ORAL ONCE
Status: DISCONTINUED | OUTPATIENT
Start: 2025-02-23 | End: 2025-02-23 | Stop reason: HOSPADM

## 2025-02-23 RX ORDER — SODIUM CHLORIDE 0.9 % (FLUSH) 0.9 %
10 SYRINGE (ML) INJECTION AS NEEDED
Status: DISCONTINUED | OUTPATIENT
Start: 2025-02-23 | End: 2025-02-23 | Stop reason: HOSPADM

## 2025-03-04 LAB
QT INTERVAL: 366 MS
QTC INTERVAL: 423 MS

## 2025-05-01 ENCOUNTER — HOSPITAL ENCOUNTER (EMERGENCY)
Facility: HOSPITAL | Age: 20
Discharge: LEFT BEFORE DISCHARGE | End: 2025-05-01
Attending: EMERGENCY MEDICINE
Payer: OTHER GOVERNMENT

## 2025-05-01 VITALS
WEIGHT: 222 LBS | RESPIRATION RATE: 17 BRPM | TEMPERATURE: 98.1 F | HEIGHT: 70 IN | HEART RATE: 83 BPM | BODY MASS INDEX: 31.78 KG/M2 | DIASTOLIC BLOOD PRESSURE: 81 MMHG | SYSTOLIC BLOOD PRESSURE: 126 MMHG | OXYGEN SATURATION: 100 %

## 2025-05-01 PROCEDURE — 99211 OFF/OP EST MAY X REQ PHY/QHP: CPT | Performed by: EMERGENCY MEDICINE

## 2025-06-10 ENCOUNTER — OFFICE VISIT (OUTPATIENT)
Dept: INTERNAL MEDICINE | Facility: CLINIC | Age: 20
End: 2025-06-10
Payer: OTHER GOVERNMENT

## 2025-06-10 VITALS
RESPIRATION RATE: 18 BRPM | OXYGEN SATURATION: 98 % | WEIGHT: 226 LBS | DIASTOLIC BLOOD PRESSURE: 70 MMHG | HEART RATE: 86 BPM | BODY MASS INDEX: 32.35 KG/M2 | SYSTOLIC BLOOD PRESSURE: 120 MMHG | TEMPERATURE: 97.5 F | HEIGHT: 70 IN

## 2025-06-10 DIAGNOSIS — Z30.9 ENCOUNTER FOR CONTRACEPTIVE MANAGEMENT, UNSPECIFIED TYPE: Primary | ICD-10-CM

## 2025-06-10 DIAGNOSIS — E66.811 CLASS 1 OBESITY DUE TO EXCESS CALORIES WITHOUT SERIOUS COMORBIDITY WITH BODY MASS INDEX (BMI) OF 32.0 TO 32.9 IN ADULT: ICD-10-CM

## 2025-06-10 DIAGNOSIS — F41.1 GAD (GENERALIZED ANXIETY DISORDER): ICD-10-CM

## 2025-06-10 DIAGNOSIS — E66.09 CLASS 1 OBESITY DUE TO EXCESS CALORIES WITHOUT SERIOUS COMORBIDITY WITH BODY MASS INDEX (BMI) OF 32.0 TO 32.9 IN ADULT: ICD-10-CM

## 2025-06-10 LAB
B-HCG UR QL: NEGATIVE
EXPIRATION DATE: NORMAL
INTERNAL NEGATIVE CONTROL: NORMAL
INTERNAL POSITIVE CONTROL: NORMAL
Lab: NORMAL

## 2025-06-10 PROCEDURE — 99214 OFFICE O/P EST MOD 30 MIN: CPT | Performed by: NURSE PRACTITIONER

## 2025-06-10 PROCEDURE — 81025 URINE PREGNANCY TEST: CPT | Performed by: NURSE PRACTITIONER

## 2025-06-10 RX ORDER — NORETHINDRONE ACETATE AND ETHINYL ESTRADIOL .02; 1 MG/1; MG/1
1 TABLET ORAL DAILY
COMMUNITY
Start: 2025-03-25 | End: 2025-06-10 | Stop reason: SDUPTHER

## 2025-06-10 RX ORDER — NORETHINDRONE ACETATE AND ETHINYL ESTRADIOL .02; 1 MG/1; MG/1
1 TABLET ORAL DAILY
Qty: 84 TABLET | Refills: 3 | Status: SHIPPED | OUTPATIENT
Start: 2025-06-10

## 2025-06-10 NOTE — PROGRESS NOTES
Chief Complaint  Contraception (Patient has previously done Depo and gained 40 Lbs, patient has an eating disorder and concerned about weight loss not helping. Patient is taking oral birth control currently. Patient had a at home positive 5 days ago, but had cycle around 5/19/25. Blood test was negative. )      Subjective      History of Present Illness  The patient is a 20-year-old female presenting for contraceptive management.    She has been consistently using Microgestin, which she tolerates better than Depo-Provera. She reported a positive home pregnancy test five days ago, followed by vaginal bleeding. An urgent care visit revealed no abnormalities, and a serum pregnancy test confirmed non-pregnancy. She suspects the positive result was due to early testing post-coitus. Her last menstrual period was on 05/19/2025 or 05/20/2025. She missed one birth control pill three days ago, which has caused her anxiety. Her menstrual periods are described as manageable.    The patient reports experiencing manageable anxiety, although it is not optimal. She is not currently in counseling due to a busy schedule with practicums and summer classes, as she is pursuing a career in social work.    Since starting Depo-Provera in September 2024, she has maintained a consistent diet and exercise regimen but has been unable to lose the weight gained between September 2024 and January 2025. She received her first and only Depo-Provera injection on 09/22/2024. Her exercise routine primarily involves weightlifting with her brother. She consumes occasional fast food but mostly eats home-cooked meals. She expresses frustration with her inability to return to her pre-Depo-Provera weight of 160 pounds, which she considers ideal for her height. She acknowledges having disordered thoughts and eating habits but feels powerless to change them.    The patient ceased smoking and vaping two days ago and finds it challenging. She lives with her  "boyfriend, brother, and their partner, none of whom smoke.    She is no longer taking famotidine and omeprazole, as her gastrointestinal issues have resolved.    GYNECOLOGICAL HISTORY:  - Last Menstrual Period: 05/19/2025 or 05/20/2025    SOCIAL HISTORY  The patient stopped smoking and vaping 2 days ago. She lives with her boyfriend, brother, and their partner.         Objective   Vital Signs:   Vitals:    06/10/25 1355   BP: 120/70   BP Location: Left arm   Patient Position: Sitting   Cuff Size: Adult   Pulse: 86   Resp: 18   Temp: 97.5 °F (36.4 °C)   TempSrc: Temporal   SpO2: 98%   Weight: 103 kg (226 lb)   Height: 177.8 cm (70\")     Body mass index is 32.43 kg/m².    Wt Readings from Last 3 Encounters:   06/10/25 103 kg (226 lb)   02/20/25 94.5 kg (208 lb 4.8 oz)   12/15/24 85.3 kg (188 lb)     BP Readings from Last 3 Encounters:   06/10/25 120/70   02/20/25 123/83   12/15/24 115/70       Health Maintenance   Topic Date Due    HPV VACCINES (2 - 2-dose series) 05/07/2017    MENINGOCOCCAL B VACCINE (1 of 2 - Standard) Never done    HEPATITIS C SCREENING  Never done    ANNUAL PHYSICAL  Never done    CHLAMYDIA SCREENING  Never done    PAP SMEAR  Never done    INFLUENZA VACCINE  07/01/2025    TDAP/TD VACCINES (2 - Td or Tdap) 07/01/2025    COVID-19 Vaccine  Completed    Pneumococcal Vaccine 0-49  Completed    MENINGOCOCCAL VACCINE  Completed       Physical Exam   Physical Exam        Result Review :  The following data was reviewed by: ARIANA King on 06/10/2025:         Results  Labs:  - Blood test for pregnancy: Negative  - In-office pregnancy test: 06/10/2025, Negative            Procedures            Assessment & Plan  Encounter for contraceptive management, unspecified type         DANIA (generalized anxiety disorder)           Class 1 obesity due to excess calories without serious comorbidity with body mass index (BMI) of 32.0 to 32.9 in adult              Assessment & Plan  1. Contraceptive management:  - " Negative in-office pregnancy test today  - Advised increased consistency in birth control regimen, including:    - Taking missed doses promptly    - Resuming scheduled doses if multiple are missed  - Discussed flexibility of combination pills vs. progesterone-only pills  - Provided 3-month refill of birth control prescription    2. Anxiety:  - Counseled on healthy lifestyle habits:    - Adequate sleep    - Regular physical activity    - Balanced diet  - Suggested strategies for managing overwhelming feelings:    - Thought-stopping behaviors    - Journaling  - Reviewed impact of busy schedule on anxiety and importance of self-care  - Patient not engaging in therapy due to time constraints    3. Weight management:  - Encouraged focus on controllable factors:    - Healthy choices    - Regular exercise  - Emphasized cardiovascular and muscle-building activities  - Discussed challenges of weight loss post-Depo shot and avoiding hyper-focus on scale  - Plan for cholesterol check in 3 months    4. Smoking cessation:  - Commended for quitting smoking and vaping  - Encouraged continuation of healthy habits and avoidance of smoking exposure  - Discussed benefits of early cessation to prevent long-term damage  - No household smoking supports cessation efforts    Follow-up:  - Follow-up in 3 months    Patient or patient representative verbalized consent for the use of Ambient Listening during the visit with  ARIANA King for chart documentation. 6/10/2025  15:16 EDT      FOLLOW UP  Return in about 3 months (around 9/10/2025).  Patient was given instructions and counseling regarding her condition or for health maintenance advice. Please see specific information pulled into the AVS if appropriate.     ARIANA King  06/10/25  15:18 EDT    CURRENT & DISCONTINUED MEDICATIONS  Current Outpatient Medications   Medication Instructions    norethindrone-ethinyl estradiol (MICROGESTIN 1/20) 1-20 MG-MCG per tablet 1 tablet,  Oral, Daily       Medications Discontinued During This Encounter   Medication Reason    norethindrone-ethinyl estradiol (MICROGESTIN 1/20) 1-20 MG-MCG per tablet Reorder    famotidine (PEPCID) 40 MG tablet     omeprazole (priLOSEC) 40 MG capsule

## 2025-07-08 ENCOUNTER — TELEPHONE (OUTPATIENT)
Dept: INTERNAL MEDICINE | Facility: CLINIC | Age: 20
End: 2025-07-08
Payer: OTHER GOVERNMENT

## 2025-07-08 DIAGNOSIS — F33.1 MODERATE EPISODE OF RECURRENT MAJOR DEPRESSIVE DISORDER: ICD-10-CM

## 2025-07-08 DIAGNOSIS — F41.1 GAD (GENERALIZED ANXIETY DISORDER): Primary | ICD-10-CM

## 2025-07-08 NOTE — TELEPHONE ENCOUNTER
Caller: ANDREABHANU    Relationship: Mother    Best call back number: [phone]     What is the medical concern/diagnosis: MENTAL HEALTH CONCERNS    What specialty or service is being requested: BEHAVIORAL HEALTH, [facility]    What is the provider, practice or medical service name: DR. REUBEN ENGLISH    What is the office location: [address] [place] [address]    What is the office phone number: [phone]    Any additional details: PATIENT WAS RECENTLY SEEN BY TAMMIE TRAVIS BUT FORGOT TO REQUEST A REFERRAL TO SEE BEHAVIORAL HEALTH. PLEASE ADVISE.

## 2025-07-30 ENCOUNTER — TELEPHONE (OUTPATIENT)
Dept: INTERNAL MEDICINE | Facility: CLINIC | Age: 20
End: 2025-07-30
Payer: OTHER GOVERNMENT

## 2025-07-30 NOTE — TELEPHONE ENCOUNTER
Caller: BHANU MENDEZ    Relationship to patient: Mother    Best call back number: [phone]     Patient is needing:     PLEASE SEE PATIENT MESSAGE FROM 7.28.2025.    PATIENT STATES SHE NOW WOULD LIKE TO KEEP HER APPOINTMENT WITH DR. HAWTHORNE.    HUB CONNECTED PATIENT WITH BEHAVIORAL HEALTH TO SEE IF SHE CAN GET IN SOONER.

## (undated) DEVICE — SOLIDIFIER LIQLOC PLS 1500CC BT

## (undated) DEVICE — Device: Brand: DEFENDO AIR/WATER/SUCTION AND BIOPSY VALVE

## (undated) DEVICE — BLCK/BITE BLOX WO/DENTL/RIM W/STRAP 54F

## (undated) DEVICE — SINGLE-USE BIOPSY FORCEPS: Brand: RADIAL JAW 4

## (undated) DEVICE — LINER SURG CANSTR SXN S/RIGD 1500CC

## (undated) DEVICE — CONN JET HYDRA H20 AUXILIARY DISP

## (undated) DEVICE — SOL IRRG H2O PL/BG 1000ML STRL

## (undated) DEVICE — Device